# Patient Record
Sex: MALE | Race: WHITE | Employment: OTHER | URBAN - METROPOLITAN AREA
[De-identification: names, ages, dates, MRNs, and addresses within clinical notes are randomized per-mention and may not be internally consistent; named-entity substitution may affect disease eponyms.]

---

## 2017-05-01 ENCOUNTER — HOSPITAL ENCOUNTER (OUTPATIENT)
Dept: GENERAL RADIOLOGY | Age: 52
Discharge: HOME | End: 2017-05-01
Payer: COMMERCIAL

## 2017-05-01 DIAGNOSIS — R05.9 COUGH: ICD-10-CM

## 2017-05-01 PROCEDURE — 71020 XR CHEST PA LAT: CPT

## 2017-05-15 ENCOUNTER — OFFICE VISIT (OUTPATIENT)
Dept: PRIMARY CARE CLINIC | Age: 52
End: 2017-05-15

## 2017-05-15 VITALS
SYSTOLIC BLOOD PRESSURE: 131 MMHG | OXYGEN SATURATION: 98 % | BODY MASS INDEX: 29.58 KG/M2 | HEART RATE: 104 BPM | HEIGHT: 72 IN | WEIGHT: 218.4 LBS | DIASTOLIC BLOOD PRESSURE: 93 MMHG | RESPIRATION RATE: 16 BRPM | TEMPERATURE: 97.8 F

## 2017-05-15 DIAGNOSIS — R61 SWEAT, SWEATING, EXCESSIVE: ICD-10-CM

## 2017-05-15 DIAGNOSIS — R42 DIZZINESS: Primary | ICD-10-CM

## 2017-05-15 DIAGNOSIS — R11.0 NAUSEA: ICD-10-CM

## 2017-05-15 LAB
GLUCOSE DOSE-GTT, POCT, GLDSPOCT: 104
MONONUCLEOSIS SCREEN POC: POSITIVE
QUICKVUE INFLUENZA TEST: NEGATIVE
VALID INTERNAL CONTROL?: YES
VALID INTERNAL CONTROL?: YES

## 2017-05-15 RX ORDER — TIZANIDINE 4 MG/1
TABLET ORAL
Refills: 3 | COMMUNITY
Start: 2017-02-16 | End: 2019-01-28 | Stop reason: SDUPTHER

## 2017-05-15 RX ORDER — DICLOFENAC SODIUM 75 MG/1
TABLET, DELAYED RELEASE ORAL
Refills: 3 | COMMUNITY
Start: 2017-04-15 | End: 2017-05-15

## 2017-05-15 RX ORDER — AZITHROMYCIN 250 MG/1
TABLET, FILM COATED ORAL
Refills: 0 | COMMUNITY
Start: 2017-05-01 | End: 2017-05-15

## 2017-05-15 RX ORDER — CHLORTHALIDONE 25 MG/1
TABLET ORAL
Refills: 1 | COMMUNITY
Start: 2017-02-20

## 2017-05-15 RX ORDER — OXYCODONE AND ACETAMINOPHEN 10; 325 MG/1; MG/1
TABLET ORAL
Refills: 0 | COMMUNITY
Start: 2017-04-20 | End: 2019-01-05

## 2017-05-15 RX ORDER — PANTOPRAZOLE SODIUM 40 MG/1
TABLET, DELAYED RELEASE ORAL
Refills: 0 | COMMUNITY
Start: 2017-02-16 | End: 2019-01-28 | Stop reason: SDUPTHER

## 2017-05-15 RX ORDER — PREDNISONE 5 MG/1
TABLET ORAL
Refills: 0 | COMMUNITY
Start: 2017-05-01 | End: 2017-05-15

## 2017-05-15 RX ORDER — MOMETASONE FUROATE AND FORMOTEROL FUMARATE DIHYDRATE 200; 5 UG/1; UG/1
AEROSOL RESPIRATORY (INHALATION)
Refills: 2 | COMMUNITY
Start: 2017-05-02 | End: 2020-02-20

## 2017-05-15 RX ORDER — HYDROCODONE BITARTRATE AND ACETAMINOPHEN 10; 325 MG/1; MG/1
TABLET ORAL
Refills: 0 | COMMUNITY
Start: 2017-03-18 | End: 2017-05-15

## 2017-05-15 RX ORDER — ONDANSETRON 4 MG/1
TABLET, ORALLY DISINTEGRATING ORAL
Refills: 2 | COMMUNITY
Start: 2017-02-23 | End: 2019-01-28 | Stop reason: SDUPTHER

## 2017-05-15 RX ORDER — NORTRIPTYLINE HYDROCHLORIDE 25 MG/1
CAPSULE ORAL
Refills: 5 | COMMUNITY
Start: 2017-03-17 | End: 2019-01-28 | Stop reason: SDUPTHER

## 2017-05-15 NOTE — PROGRESS NOTES
This note will not be viewable in [address] Subjective:   Anali Adams is a 54 y.o. male who complains of just not feeling well, nausea, shaky, sweating, chills, and feeling lightheaded, for 2-3 days, stable since that time. Yesterday he had drenching sweats with any kind of activity. The symptoms started over the weekend and worsened today. He has not been able to eat today, feeling nauseated when attempting to eat. He has only had coffee today. His wife, a NP, is here with him today and mentions that he he had a similar episode in November 2016 at his PCP (Dr. Cecy Najera) office. He was sent by EMS to the ED for evaluation. They did not find any cause to explain his symptoms. She also mentions that sometimes his headaches start like this (prodrome). History of cervical dystonia and chronic headaches. He is seen by Dr. Belva Hashimoto, Neurology. He receives botox injections for the headaches, last injection 5/12. About 2 weeks ago he took Percocet (X 4 days) for an intractable headache. In addition, 2 weeks ago he was seen by his PCP, diagnosed with bronchospasm. He was treated with antibiotics, Dulera, and Albuterol. He also quit smoking. He developed thrush a few days ago, currently using Nystatin. He also mentions that he works with and has shared drinks with his son who was recently diagnosed with mono. He denies a history of chest pain, sore throat, swollen glands, cough, shortness of breath, abdominal pain, vomiting and wheezing. Denies any urinary symptoms. Evaluation to date: none. Treatment to date: none. Patient does not smoke cigarettes. Relevant PMH:   Past Medical History:   Diagnosis Date    Cervical dystonia     Hypertension      Past Surgical History:   Procedure Laterality Date    HX ORTHOPAEDIC Left     Shoulder       No Known Allergies      PCP - Dr. Cecy Najera    Review of Systems  Pertinent items are noted in HPI.     Objective:     Visit Vitals    BP (!) 131/93 (BP 1 Location: Left arm, BP Patient Position: Sitting)    Pulse (!) 104    Temp 97.8 °F (36.6 °C) (Oral)    Resp 16    Ht 6' (1.829 m)    Wt 218 lb 6.4 oz (99.1 kg)    SpO2 98%    BMI 29.62 kg/m2     General:  alert, cooperative, pleasant, and diaphoretic   Eyes: negative   Ears: normal TM's and external ear canals AU   Sinuses: Normal paranasal sinuses without tenderness   Mouth:  Lips, mucosa, and tongue normal. Teeth and gums normal and normal findings: posterior pharynx with mild erythema   Neck: supple, symmetrical, trachea midline and no adenopathy. Heart: S1 and S2 normal, no murmurs noted. Lungs: clear to auscultation bilaterally   Skin: Cool and clammy   Abdomen: soft, non-tender. Bowel sounds normal. No masses,  no organomegaly        Rapid flu - negative  Monospot - positive (delayed result)  Blood sugar - 104  12 lead EKG - ST with rate of 103      Assessment/Plan:       ICD-10-CM ICD-9-CM    1. Dizziness R42 780.4 AMB POC GLUCOSE TEST      AMB POC EKG ROUTINE W/ 12 LEADS, INTER & REP   2. Sweat, sweating, excessive R61 780.8 AMB POC GLUCOSE TEST      AMB POC RAPID INFLUENZA TEST      AMB POC EKG ROUTINE W/ 12 LEADS, INTER & REP   3. Nausea R11.0 787.02 AMB POC GLUCOSE TEST      AMB POC RAPID INFLUENZA TEST      AMB POC EKG ROUTINE W/ 12 LEADS, INTER & REP     Pt became very dizzy and feeling faint in office. He laid down and is sweating profusely. Recommend he go to ED now for evaluation. He feels a little headache now and believes he has a headache starting. After laying down for a few minutes, he felt a little better. He states he doesn't want to go, his wife is trying to encourage him to go. They decided to go to her office, try to push fluids, and if any worsening symptoms will go to ED. Monospot with a delayed + result (false +?), offered further testing for EBV, they decline. Recommend ED evaluation and close PCP follow-up. RTC prn.       Catina Márquez, NP

## 2017-05-15 NOTE — PATIENT INSTRUCTIONS
Dizziness: Care Instructions  Your Care Instructions  Dizziness is the feeling of unsteadiness or fuzziness in your head. It is different than having vertigo, which is a feeling that the room is spinning or that you are moving or falling. It is also different from lightheadedness, which is the feeling that you are about to faint. It can be hard to know what causes dizziness. Some people feel dizzy when they have migraine headaches. Sometimes bouts of flu can make you feel dizzy. Some medical conditions, such as heart problems or high blood pressure, can make you feel dizzy. Many medicines can cause dizziness, including medicines for high blood pressure, pain, or anxiety. If a medicine causes your symptoms, your doctor may recommend that you stop or change the medicine. If it is a problem with your heart, you may need medicine to help your heart work better. If there is no clear reason for your symptoms, your doctor may suggest watching and waiting for a while to see if the dizziness goes away on its own. Follow-up care is a key part of your treatment and safety. Be sure to make and go to all appointments, and call your doctor if you are having problems. It's also a good idea to know your test results and keep a list of the medicines you take. How can you care for yourself at home? · If your doctor recommends or prescribes medicine, take it exactly as directed. Call your doctor if you think you are having a problem with your medicine. · Do not drive while you feel dizzy. · Try to prevent falls. Steps you can take include:  ¨ Using nonskid mats, adding grab bars near the tub, and using night-lights. ¨ Clearing your home so that walkways are free of anything you might trip on. ¨ Letting family and friends know that you have been feeling dizzy. This will help them know how to help you. When should you call for help? Call 911 anytime you think you may need emergency care.  For example, call if:  · You passed out (lost consciousness). · You have dizziness along with symptoms of a heart attack. These may include:  ¨ Chest pain or pressure, or a strange feeling in the chest.  ¨ Sweating. ¨ Shortness of breath. ¨ Nausea or vomiting. ¨ Pain, pressure, or a strange feeling in the back, neck, jaw, or upper belly or in one or both shoulders or arms. ¨ Lightheadedness or sudden weakness. ¨ A fast or irregular heartbeat. · You have symptoms of a stroke. These may include:  ¨ Sudden numbness, tingling, weakness, or loss of movement in your face, arm, or leg, especially on only one side of your body. ¨ Sudden vision changes. ¨ Sudden trouble speaking. ¨ Sudden confusion or trouble understanding simple statements. ¨ Sudden problems with walking or balance. ¨ A sudden, severe headache that is different from past headaches. Call your doctor now or seek immediate medical care if:  · You feel dizzy and have a fever, headache, or ringing in your ears. · You have new or increased nausea and vomiting. · Your dizziness does not go away or comes back. Watch closely for changes in your health, and be sure to contact your doctor if:  · You do not get better as expected. Where can you learn more? Go to http://chi-jovita.info/. Enter K552 in the search box to learn more about \"Dizziness: Care Instructions. \"  Current as of: May 27, 2016  Content Version: 11.2  © 9968-4850 [employer]. Care instructions adapted under license by [employer] (which disclaims liability or warranty for this information). If you have questions about a medical condition or this instruction, always ask your healthcare professional. [place] [address] any warranty or liability for your use of this information.

## 2017-05-15 NOTE — PROGRESS NOTES
Chief Complaint   Patient presents with    Lethargy     pt c/o chills and lethargy, denies having flu shot, states son was recently dx with mono    Chills

## 2017-09-21 ENCOUNTER — HOSPITAL ENCOUNTER (OUTPATIENT)
Dept: CT IMAGING | Age: 52
Discharge: HOME | End: 2017-09-21
Attending: FAMILY MEDICINE
Payer: COMMERCIAL

## 2017-09-21 DIAGNOSIS — Z13.6 SCREENING, HEART DISEASE, ISCHEMIC: ICD-10-CM

## 2017-09-21 PROCEDURE — 75571 CT HRT W/O DYE W/CA TEST: CPT

## 2017-09-22 NOTE — CARDIO/PULMONARY
Reached patient at his given home/mobile number and shared his coronary artery CT score of 208 with him. Also shared that the majority of this score is in a single vessel. We discussed the meaning of this score. Patient has no further questions at this time. Patient will follow up with his PCP, Dr. Mayuri Vega.

## 2017-10-12 ENCOUNTER — OFFICE VISIT (OUTPATIENT)
Dept: CARDIOLOGY CLINIC | Age: 52
End: 2017-10-12

## 2017-10-12 VITALS
BODY MASS INDEX: 29.81 KG/M2 | OXYGEN SATURATION: 97 % | WEIGHT: 220.1 LBS | SYSTOLIC BLOOD PRESSURE: 114 MMHG | DIASTOLIC BLOOD PRESSURE: 68 MMHG | HEIGHT: 72 IN | HEART RATE: 77 BPM | RESPIRATION RATE: 20 BRPM

## 2017-10-12 DIAGNOSIS — R53.83 FATIGUE, UNSPECIFIED TYPE: ICD-10-CM

## 2017-10-12 DIAGNOSIS — I25.84 CORONARY ARTERY CALCIFICATION: ICD-10-CM

## 2017-10-12 DIAGNOSIS — I25.10 CORONARY ARTERY CALCIFICATION: ICD-10-CM

## 2017-10-12 DIAGNOSIS — R06.02 SOB (SHORTNESS OF BREATH): Primary | ICD-10-CM

## 2017-10-12 DIAGNOSIS — I10 ESSENTIAL HYPERTENSION: ICD-10-CM

## 2017-10-12 RX ORDER — METOPROLOL SUCCINATE 100 MG/1
50 TABLET, EXTENDED RELEASE ORAL DAILY
COMMUNITY
Start: 2017-10-12 | End: 2019-01-28 | Stop reason: SDUPTHER

## 2017-10-12 NOTE — PROGRESS NOTES
John Bolanos DNP, ANP-BC  Subjective/HPI:     Gale Lorenzana is a 54 y.o. male is here for new patient consultation regarding fatigue, dyspnea on exertion and abnormal coronary calcium score. Patient has a left main score of 181 total 208. Cardiac risk factors include male, hypertension, family history of heart disease. He is a smoker currently on Chantix reducing tobacco intake to 3 cigarettes a day or less. History of hypertension, has been on metoprolol for at least 3 years, stating 1 year ago dosing was increased to 100 mg daily.       Labs from primary care reviewed showing normal CBC, normal TSH, negative Western blot, mild elevated CRP, metabolic panel normal.    FINDINGS:  The coronary calcium in each vessel is as follows:     Left main coronary artery: 181  Left anterior descending coronary artery: 14  Left circumflex coronary artery: 0  Right coronary artery: 0  Posterior descending coronary artery: 0  1st diagonal      Total calcium score: 208      Calcium score interpretation:  0-0 = No evidence of CAD  1-10 = Minimal evidence of CAD   = Mild evidence of CAD  101-400 = Moderate evidence of CAD  >400 = Extensive evidence of CAD    PCP Provider  Rick Ward MD  Past Medical History:   Diagnosis Date    Cervical dystonia     Hypertension       Past Surgical History:   Procedure Laterality Date    HX ORTHOPAEDIC Left     Shoulder     No Known Allergies   Family History   Problem Relation Age of Onset    Hypertension Father     Diabetes Father    Sebastian Vicki Gout Father     Alcohol abuse Sister     Hypertension Brother     Gout Brother       Current Outpatient Prescriptions   Medication Sig    chlorthalidone (HYGROTEN) 25 mg tablet TAKE 1 TABLET BY MOUTH EVERY MORNING    ondansetron (ZOFRAN ODT) 4 mg disintegrating tablet PLACE 1 TAB UNDER THE TONGUE AND ALLOW TO DISSOLVE EERY 8 HOURS AS NEEDED    pantoprazole (PROTONIX) 40 mg tablet TAKE 1 TABLET BY MOUTH DAILY    tiZANidine (ZANAFLEX) 4 mg tablet TAKE 2 TABLETS BY MOUTH AT BEDTIME    albuterol (PROAIR HFA) 90 mcg/actuation inhaler Take 2 Puffs by inhalation every six (6) hours as needed for Wheezing. Indications: BRONCHOSPASM PREVENTION    metoprolol succinate (TOPROL-XL) 100 mg tablet Take  by mouth daily.  DULERA 200-5 mcg/actuation HFA inhaler TAKE 2 PUFFS BY MOUTH EVERY 12 HOURS    nortriptyline (PAMELOR) 25 mg capsule TAKE ONE CAPSULE BY MOUTH AT BEDTIME    oxyCODONE-acetaminophen (PERCOCET 10)  mg per tablet TAKE 1 TABLET BY MOUTH EVERY 6 HOURS AS NEEDED    nortriptyline (PAMELOR) 50 mg capsule Take 50 mg by mouth nightly.  pantoprazole (PROTONIX) 20 mg tablet Take 20 mg by mouth daily. No current facility-administered medications for this visit. Vitals:    10/12/17 1327 10/12/17 1336   BP: 118/70 114/68   Pulse: 77    Resp: 20    SpO2: 97%    Weight: 220 lb 1.6 oz (99.8 kg)    Height: 6' (1.829 m)      Social History     Social History    Marital status:      Spouse name: N/A    Number of children: N/A    Years of education: N/A     Occupational History    Not on file. Social History Main Topics    Smoking status: Current Every Day Smoker     Packs/day: 1.50     Years: 25.00     Types: Cigarettes    Smokeless tobacco: Not on file    Alcohol use No    Drug use: No    Sexual activity: Not on file     Other Topics Concern    Not on file     Social History Narrative       I have reviewed the nurses notes, vitals, problem list, allergy list, medical history, family, social history and medications. Review of Symptoms:    General: Pt denies excessive weight gain or loss. Pt is able to conduct ADL's  HEENT: Denies blurred vision, headaches, epistaxis and difficulty swallowing. Respiratory: Denies shortness of breath, + TUCKER, wheezing or stridor.   Cardiovascular: Denies precordial pain, palpitations, edema or PND  Gastrointestinal: Denies poor appetite, indigestion, abdominal pain or blood in stool  Musculoskeletal: Denies pain or swelling from muscles or joints  Neurologic: Denies tremor, paresthesias, or sensory motor disturbance  Skin: Denies rash, itching or texture change. Physical Exam:      General: Well developed, in no acute distress, cooperative and alert  HEENT: No carotid bruits, no JVD, trach is midline. Neck Supple, PEERL, EOM intact. Heart:  Normal S1/S2 negative S3 or S4. Regular, no murmur, gallop or rub.   Respiratory: Clear bilaterally x 4, no wheezing or rales  Abdomen:   Soft, non-tender, no masses, bowel sounds are active.   Extremities:  No edema, normal cap refill, no cyanosis, atraumatic. Neuro: A&Ox3, speech clear, gait stable. Skin: Skin color is normal. No rashes or lesions.  Non diaphoretic  Vascular: 2+ pulses symmetric in all extremities    Cardiographics    ECG: Normal sinus rhythm  Results for orders placed or performed during the hospital encounter of 11/11/16   EKG, 12 LEAD, INITIAL   Result Value Ref Range    Ventricular Rate 64 BPM    Atrial Rate 64 BPM    P-R Interval 158 ms    QRS Duration 92 ms    Q-T Interval 408 ms    QTC Calculation (Bezet) 420 ms    Calculated P Axis 43 degrees    Calculated T Axis -2 degrees    Diagnosis       Normal sinus rhythm  Minimal voltage criteria for LVH, may be normal variant  Borderline ECG  No previous ECGs available  Confirmed by Ally Fung MD., [facility] ([id number]) on 11/11/2016 5:18:24 PM           Cardiology Labs:  No results found for: CHOL, CHOLX, CHLST, CHOLV, 158609, HDL, LDL, LDLC, DLDLP, [place], [facility], [facility]    Lab Results   Component Value Date/Time    Sodium 139 11/11/2016 01:52 PM    Potassium 3.9 11/11/2016 01:52 PM    Chloride 101 11/11/2016 01:52 PM    CO2 28 11/11/2016 01:52 PM    Anion gap 10 11/11/2016 01:52 PM    Glucose 109 11/11/2016 01:52 PM    BUN 14 11/11/2016 01:52 PM    Creatinine 0.86 11/11/2016 01:52 PM    BUN/Creatinine ratio 16 11/11/2016 01:52 PM    GFR est AA >60 11/11/2016 01:52 PM GFR est non-AA >60 11/11/2016 01:52 PM    Calcium 9.1 11/11/2016 01:52 PM           Assessment:     Assessment:     Diagnoses and all orders for this visit:    1. SOB (shortness of breath)  -     AMB POC EKG ROUTINE W/ 12 LEADS, INTER & REP  -     2D ECHO COMPLETE ADULT (TTE) W OR WO CONTR; Future  -     ECHO TTE STRESS EXRCSE COMP W OR WO CONTR; Future  -     LIPID PANEL    2. Coronary artery calcification  -     2D ECHO COMPLETE ADULT (TTE) W OR WO CONTR; Future  -     ECHO TTE STRESS EXRCSE COMP W OR WO CONTR; Future  -     LIPID PANEL    3. Essential hypertension  -     2D ECHO COMPLETE ADULT (TTE) W OR WO CONTR; Future  -     ECHO TTE STRESS EXRCSE COMP W OR WO CONTR; Future  -     LIPID PANEL        ICD-10-CM ICD-9-CM    1. SOB (shortness of breath) R06.02 786.05 AMB POC EKG ROUTINE W/ 12 LEADS, INTER & REP      2D ECHO COMPLETE ADULT (TTE) W OR WO CONTR      ECHO TTE STRESS EXRCSE COMP W OR WO CONTR      LIPID PANEL   2. Coronary artery calcification I25.10 414.00 2D ECHO COMPLETE ADULT (TTE) W OR WO CONTR    I25.84 414.4 ECHO TTE STRESS EXRCSE COMP W OR WO CONTR      LIPID PANEL   3. Essential hypertension I10 401.9 2D ECHO COMPLETE ADULT (TTE) W OR WO CONTR      ECHO TTE STRESS EXRCSE COMP W OR WO CONTR      LIPID PANEL     Orders Placed This Encounter    LIPID PANEL    AMB POC EKG ROUTINE W/ 12 LEADS, INTER & REP     Order Specific Question:   Reason for Exam:     Answer:   routine    2D ECHO COMPLETE ADULT (TTE) W OR WO CONTR     Standing Status:   Future     Standing Expiration Date:   4/12/2018     Order Specific Question:   Reason for Exam:     Answer:   TUCKER     Order Specific Question:   Contrast Enhancement (Bubble Study, Definity, Optison) may be used if criteria listed in established evidence-based protocol has been identified. Answer:    Yes    ECHO TTE STRESS EXRCSE COMP W OR WO CONTR     Standing Status:   Future     Standing Expiration Date:   4/12/2018     Order Specific Question: Reason for Exam:     Answer:   TUCKER     Order Specific Question:   Contrast Enhancement (Bubble Study, Definity, Optison) may be used if criteria listed in established evidence-based protocol has been identified. Answer:   Yes        Plan:     Patient is a 57-year-old male with a calcium score of 191 in the left main total 208. He is experiencing dyspnea on exertion and fatigue. Cardiac risk factors include hypertension, smoker, male and family history of heart disease. Will rule out ischemia with echocardiogram and stress echo. Will reduce metoprolol from 100 mg daily down to 50 mg daily as this may be a source of his fatigue, goal will be transitioning off beta blockers to angiotensive receptor blockers with diuretic for treatment of hypertension. Start aspirin 81 mg daily, lipid panel most likly will be placing him on statin therapy. Follow-up when testing complete. Summer Avila NP      [facility]    10/12/2017         Agree with note as outlined by  NP. I confirm findings in history and physical exam. No additional findings noted. Agree with plan as outlined above. He has a previous h/o cardiomyopathy with an EF of 15-20% several years ago. Improved per patient.     Amanda Art MD

## 2017-10-24 ENCOUNTER — CLINICAL SUPPORT (OUTPATIENT)
Dept: CARDIOLOGY CLINIC | Age: 52
End: 2017-10-24

## 2017-10-24 DIAGNOSIS — I25.10 CORONARY ARTERY CALCIFICATION: ICD-10-CM

## 2017-10-24 DIAGNOSIS — R06.02 SOB (SHORTNESS OF BREATH): ICD-10-CM

## 2017-10-24 DIAGNOSIS — I10 ESSENTIAL HYPERTENSION: ICD-10-CM

## 2017-10-24 DIAGNOSIS — I25.84 CORONARY ARTERY CALCIFICATION: ICD-10-CM

## 2017-10-27 ENCOUNTER — CLINICAL SUPPORT (OUTPATIENT)
Dept: CARDIOLOGY CLINIC | Age: 52
End: 2017-10-27

## 2017-10-27 DIAGNOSIS — I25.10 CORONARY ARTERY CALCIFICATION: ICD-10-CM

## 2017-10-27 DIAGNOSIS — I25.84 CORONARY ARTERY CALCIFICATION: ICD-10-CM

## 2017-10-27 DIAGNOSIS — I10 ESSENTIAL HYPERTENSION: ICD-10-CM

## 2017-10-27 DIAGNOSIS — R06.02 SOB (SHORTNESS OF BREATH): ICD-10-CM

## 2019-01-05 ENCOUNTER — APPOINTMENT (OUTPATIENT)
Dept: GENERAL RADIOLOGY | Age: 54
End: 2019-01-05
Attending: NURSE PRACTITIONER
Payer: COMMERCIAL

## 2019-01-05 ENCOUNTER — HOSPITAL ENCOUNTER (EMERGENCY)
Age: 54
Discharge: HOME | End: 2019-01-05
Attending: EMERGENCY MEDICINE | Admitting: EMERGENCY MEDICINE
Payer: COMMERCIAL

## 2019-01-05 VITALS
OXYGEN SATURATION: 98 % | HEART RATE: 95 BPM | TEMPERATURE: 98.6 F | HEIGHT: 72 IN | BODY MASS INDEX: 31.89 KG/M2 | DIASTOLIC BLOOD PRESSURE: 128 MMHG | WEIGHT: 235.45 LBS | SYSTOLIC BLOOD PRESSURE: 179 MMHG | RESPIRATION RATE: 16 BRPM

## 2019-01-05 DIAGNOSIS — S61.210A LACERATION OF RIGHT INDEX FINGER WITHOUT FOREIGN BODY WITHOUT DAMAGE TO NAIL, INITIAL ENCOUNTER: Primary | ICD-10-CM

## 2019-01-05 PROCEDURE — 74011250636 HC RX REV CODE- 250/636: Performed by: EMERGENCY MEDICINE

## 2019-01-05 PROCEDURE — 73140 X-RAY EXAM OF FINGER(S): CPT

## 2019-01-05 PROCEDURE — 96372 THER/PROPH/DIAG INJ SC/IM: CPT

## 2019-01-05 PROCEDURE — 74011000250 HC RX REV CODE- 250: Performed by: EMERGENCY MEDICINE

## 2019-01-05 PROCEDURE — 75810000293 HC SIMP/SUPERF WND  RPR

## 2019-01-05 PROCEDURE — 77030031132 HC SUT NYL COVD -A

## 2019-01-05 PROCEDURE — 77030018836 HC SOL IRR NACL ICUM -A

## 2019-01-05 PROCEDURE — 74011250637 HC RX REV CODE- 250/637: Performed by: NURSE PRACTITIONER

## 2019-01-05 PROCEDURE — 77030002986 HC SUT PROL J&J -A

## 2019-01-05 PROCEDURE — 99283 EMERGENCY DEPT VISIT LOW MDM: CPT

## 2019-01-05 RX ORDER — AMOXICILLIN AND CLAVULANATE POTASSIUM 875; 125 MG/1; MG/1
1 TABLET, FILM COATED ORAL 2 TIMES DAILY
Qty: 14 TAB | Refills: 0 | Status: SHIPPED | OUTPATIENT
Start: 2019-01-05 | End: 2019-01-12

## 2019-01-05 RX ORDER — OXYCODONE AND ACETAMINOPHEN 5; 325 MG/1; MG/1
1 TABLET ORAL
Status: COMPLETED | OUTPATIENT
Start: 2019-01-05 | End: 2019-01-05

## 2019-01-05 RX ORDER — AMOXICILLIN AND CLAVULANATE POTASSIUM 875; 125 MG/1; MG/1
1 TABLET, FILM COATED ORAL 2 TIMES DAILY
Qty: 14 TAB | Refills: 0 | Status: SHIPPED | OUTPATIENT
Start: 2019-01-05 | End: 2019-01-05

## 2019-01-05 RX ORDER — OXYCODONE AND ACETAMINOPHEN 5; 325 MG/1; MG/1
1 TABLET ORAL
Qty: 16 TAB | Refills: 0 | Status: SHIPPED | OUTPATIENT
Start: 2019-01-05 | End: 2019-01-28 | Stop reason: SDUPTHER

## 2019-01-05 RX ORDER — CEFAZOLIN SODIUM 1 G/3ML
1 INJECTION, POWDER, FOR SOLUTION INTRAMUSCULAR; INTRAVENOUS
Status: DISCONTINUED | OUTPATIENT
Start: 2019-01-05 | End: 2019-01-05

## 2019-01-05 RX ORDER — OXYCODONE AND ACETAMINOPHEN 5; 325 MG/1; MG/1
1 TABLET ORAL
Qty: 16 TAB | Refills: 0 | Status: SHIPPED | OUTPATIENT
Start: 2019-01-05 | End: 2019-01-05

## 2019-01-05 RX ADMIN — WATER 1000 MG: 1 INJECTION INTRAMUSCULAR; INTRAVENOUS; SUBCUTANEOUS at 17:28

## 2019-01-05 RX ADMIN — OXYCODONE AND ACETAMINOPHEN 1 TABLET: 5; 325 TABLET ORAL at 17:00

## 2019-01-05 NOTE — ED NOTES
Jv Mckeon NP reviewed discharge instructions with the patient. The patient and spouse verbalized understanding. Patient ambulatory out of ED with steady gait. Transportation provided by wife.

## 2019-01-05 NOTE — DISCHARGE INSTRUCTIONS
Patient Education        Cuts on the Hand Closed With Stitches: Care Instructions  Your Care Instructions    A cut on your hand can be on your fingers, your thumb, or the front or back of your hand. Sometimes a cut can injure the tendons, blood vessels, or nerves of your hand. The doctor used stitches to close the cut. Using stitches also helps the cut heal and reduces scarring. The doctor may have given you a splint to help prevent you from moving your hand, fingers, or thumb. If the cut went deep and through the skin, the doctor put in two layers of stitches. The deeper layer brings the deep part of the cut together. These stitches will dissolve and don't need to be removed. The stitches in the upper layer are the ones you see on the cut. You will probably have a bandage. You will need to have the stitches removed, usually in 7 to 14 days. The doctor may suggest that you see a hand specialist if the cut is very deep or if you have trouble moving your fingers or have less feeling in your hand. The doctor has checked you carefully, but problems can develop later. If you notice any problems or new symptoms, get medical treatment right away. Follow-up care is a key part of your treatment and safety. Be sure to make and go to all appointments, and call your doctor if you are having problems. It's also a good idea to know your test results and keep a list of the medicines you take. How can you care for yourself at home? · Keep the cut dry for the first 24 to 48 hours. After this, you can shower if your doctor okays it. Pat the cut dry. · Don't soak the cut, such as in a bathtub. Your doctor will tell you when it's safe to get the cut wet. · If your doctor told you how to care for your cut, follow your doctor's instructions. If you did not get instructions, follow this general advice:  ? After the first 24 to 48 hours, wash around the cut with clean water 2 times a day.  Don't use hydrogen peroxide or alcohol, which can slow healing. ? You may cover the cut with a thin layer of petroleum jelly, such as Vaseline, and a nonstick bandage. ? Apply more petroleum jelly and replace the bandage as needed. · Prop up the sore hand on a pillow anytime you sit or lie down during the next 3 days. Try to keep it above the level of your heart. This will help reduce swelling. · Avoid any activity that could cause your cut to reopen. · Do not remove the stitches on your own. Your doctor will tell you when to come back to have the stitches removed. · Be safe with medicines. Take pain medicines exactly as directed. ? If the doctor gave you a prescription medicine for pain, take it as prescribed. ? If you are not taking a prescription pain medicine, ask your doctor if you can take an over-the-counter medicine. When should you call for help? Call your doctor now or seek immediate medical care if:    · You have new pain, or your pain gets worse.     · The skin near the cut is cold or pale or changes color.     · You have tingling, weakness, or numbness near the cut.     · The cut starts to bleed, and blood soaks through the bandage. Oozing small amounts of blood is normal.     · You have trouble moving the area of the hand near the cut.     · You have symptoms of infection, such as:  ? Increased pain, swelling, warmth, or redness around the cut.  ? Red streaks leading from the cut.  ? Pus draining from the cut.  ? A fever.    Watch closely for changes in your health, and be sure to contact your doctor if:    · You do not get better as expected. Where can you learn more? Go to http://chi-jovita.info/. Enter T250 in the search box to learn more about \"Cuts on the Hand Closed With Stitches: Care Instructions. \"  Current as of: November 20, 2017  Content Version: 11.8  © 3904-4296 [employer].  Care instructions adapted under license by [employer] (which disclaims liability or warranty for this information). If you have questions about a medical condition or this instruction, always ask your healthcare professional. [address] any warranty or liability for your use of this information. We hope that we have addressed all of your medical concerns. The examination and treatment you received in the Emergency Department were for an emergent problem and were not intended as complete care. It is important that you follow up with your healthcare provider(s) for ongoing care. If your symptoms worsen or do not improve as expected, and you are unable to reach your usual health care provider(s), you should return to the Emergency Department. Le[facility] Cheek participate in nationally recognized quality of care measures. If your blood pressure is greater than 120/80, as reported below, we urge that you seek medical care to address the potential of high blood pressure, commonly known as hypertension. Hypertension can be hereditary or can be caused by certain medical conditions, pain, stress, or \"white coat syndrome. \"       Please make an appointment with your health care provider(s) for follow up of your Emergency Department visit. VITALS:   Patient Vitals for the past 8 hrs:   Temp Pulse Resp BP SpO2   01/05/19 1602 98.6 °F (37 °C) 95 16 (!) 179/128 98 %          Thank you for allowing us to provide you with medical care today. We realize that you have many choices for your emergency care needs. Please choose us in the future for any continued health care needs. Margo Altamirano Scales, NP            No results found for this or any previous visit (from the past 24 hour(s)). Xr 2nd Finger Rt Min 2 V    Result Date: 1/5/2019  EXAM: XR 2ND FINGER RT MIN 2 V INDICATION: Smashed right second digit in , laceration. COMPARISON: None.  FINDINGS: Three views of the right second finger demonstrate no fracture or other acute osseous or articular abnormality. The soft tissues are within normal limits. IMPRESSION: No acute abnormality.

## 2019-01-06 NOTE — ED PROVIDER NOTES
EMERGENCY DEPARTMENT HISTORY AND PHYSICAL EXAM 
 
 
Date: 1/5/2019 Patient Name: Ranjana Mcgee History of Presenting Illness Chief Complaint Patient presents with  Finger Pain  
  pt reports smashing right 2nd finger in  today History Provided By: Patient HPI: Ranjana Mcgee, 48 y.o. male presents ambulatory to the ED having cut his right second digit with a  just PTA. Tetanus is UTD. He was in his normal state of health prior. There are no other acute health care concerns today. Pt denies fevers, chills, night sweats, chest pain, pressure, SOB, TUCKER, PND, orthopnea, abdominal pain, n/v/d, melena, hematuria, dysuria, constipation, HA, dizziness, and syncope There are no other complaints, changes, or physical findings at this time. PCP: Dez Gonzalez MD 
 
No current facility-administered medications on file prior to encounter. Current Outpatient Medications on File Prior to Encounter Medication Sig Dispense Refill  metoprolol succinate (TOPROL-XL) 100 mg tablet Take 0.5 Tabs by mouth daily.  chlorthalidone (HYGROTEN) 25 mg tablet TAKE 1 TABLET BY MOUTH EVERY MORNING  1  
 DULERA 200-5 mcg/actuation HFA inhaler TAKE 2 PUFFS BY MOUTH EVERY 12 HOURS  2  
 nortriptyline (PAMELOR) 25 mg capsule TAKE ONE CAPSULE BY MOUTH AT BEDTIME  5  
 ondansetron (ZOFRAN ODT) 4 mg disintegrating tablet PLACE 1 TAB UNDER THE TONGUE AND ALLOW TO DISSOLVE EERY 8 HOURS AS NEEDED  2  
 pantoprazole (PROTONIX) 40 mg tablet TAKE 1 TABLET BY MOUTH DAILY  0  
 tiZANidine (ZANAFLEX) 4 mg tablet TAKE 2 TABLETS BY MOUTH AT BEDTIME  3  
 nortriptyline (PAMELOR) 50 mg capsule Take 50 mg by mouth nightly.  albuterol (PROAIR HFA) 90 mcg/actuation inhaler Take 2 Puffs by inhalation every six (6) hours as needed for Wheezing. Indications: BRONCHOSPASM PREVENTION 1 Inhaler 0  
 pantoprazole (PROTONIX) 20 mg tablet Take 20 mg by mouth daily. Past History Past Medical History: 
Past Medical History:  
Diagnosis Date  Cervical dystonia  Hypertension Past Surgical History: 
Past Surgical History:  
Procedure Laterality Date  HX ORTHOPAEDIC Left Shoulder Family History: 
Family History Problem Relation Age of Onset  Hypertension Father  Diabetes Father  Gout Father  Alcohol abuse Sister  Hypertension Brother  Gout Brother Social History: 
Social History Tobacco Use  Smoking status: Current Every Day Smoker Packs/day: 1.50 Years: 25.00 Pack years: 37.50 Types: Cigarettes Substance Use Topics  Alcohol use: No  
  Alcohol/week: 0.0 oz  Drug use: No  
 
 
Allergies: 
No Known Allergies Review of Systems Review of Systems Constitutional: Negative for activity change, appetite change, chills, diaphoresis, fatigue, fever and unexpected weight change. HENT: Negative for congestion, ear pain, rhinorrhea, sinus pressure, sore throat, tinnitus, trouble swallowing and voice change. Eyes: Negative for pain, discharge, redness and visual disturbance. Respiratory: Negative for apnea, cough, choking, chest tightness, shortness of breath, wheezing and stridor. Cardiovascular: Negative for chest pain, palpitations and leg swelling. Gastrointestinal: Negative for abdominal pain, constipation, nausea and vomiting. Endocrine: Negative for cold intolerance and heat intolerance. Genitourinary: Negative for difficulty urinating, dysuria, flank pain, hematuria, testicular pain and urgency. Musculoskeletal: Negative for arthralgias, back pain, gait problem, joint swelling, myalgias, neck pain and neck stiffness. Skin: Positive for wound. Negative for color change, pallor and rash. Allergic/Immunologic: Negative for immunocompromised state. Neurological: Negative for dizziness, tremors, syncope, weakness, light-headedness, numbness and headaches. Hematological: Does not bruise/bleed easily. Psychiatric/Behavioral: Negative for agitation, confusion and suicidal ideas. Physical Exam  
Physical Exam  
Constitutional: He is oriented to person, place, and time. He appears well-developed and well-nourished. No distress. HENT:  
Head: Atraumatic. Nose: Nose normal.  
Mouth/Throat: No oropharyngeal exudate. Eyes: Conjunctivae and EOM are normal. Right eye exhibits no discharge. Left eye exhibits no discharge. No scleral icterus. Neck: Normal range of motion. Neck supple. No JVD present. No tracheal deviation present. No thyromegaly present. Cardiovascular: Normal rate and regular rhythm. Exam reveals no gallop and no friction rub. No murmur heard. Pulmonary/Chest: Breath sounds normal. No stridor. No respiratory distress. He has no wheezes. He has no rales. He exhibits no tenderness. Abdominal: Soft. Bowel sounds are normal. He exhibits no distension and no mass. There is no tenderness. There is no rebound and no guarding. Musculoskeletal: Normal range of motion. He exhibits no edema or tenderness. Right hand: He exhibits laceration. Hands: 
Lymphadenopathy:  
  He has no cervical adenopathy. Neurological: He is alert and oriented to person, place, and time. Coordination normal.  
Skin: Skin is warm and dry. He is not diaphoretic. Psychiatric: He has a normal mood and affect. His behavior is normal.  
Nursing note and vitals reviewed. Diagnostic Study Results Labs - No results found for this or any previous visit (from the past 12 hour(s)). Radiologic Studies -  
XR 2ND FINGER RT MIN 2 V Final Result IMPRESSION: No acute abnormality. CT Results  (Last 48 hours) None CXR Results  (Last 48 hours) None Medical Decision Making I am the first provider for this patient.  
 
I reviewed the vital signs, available nursing notes, past medical history, past surgical history, family history and social history. Vital Signs-Reviewed the patient's vital signs. Visit Vitals BP (!) 179/128 (BP 1 Location: Left arm, BP Patient Position: Sitting) Pulse 95 Temp 98.6 °F (37 °C) Resp 16 Ht 6' (1.829 m) Wt 106.8 kg (235 lb 7.2 oz) SpO2 98% BMI 31.93 kg/m² Pulse Oximetry Analysis - 98% on RA Records Reviewed: Nursing Notes and Old Medical Records Provider Notes (Medical Decision Making): Laceration XR is suspicious for fracture Wound repair Ancef Wound Repair 
Date/Time: 1/5/2019 6:00 PM 
Performed by: NPPreparation: skin prepped with Betadine Pre-procedure re-eval: Immediately prior to the procedure, the patient was reevaluated and found suitable for the planned procedure and any planned medications. Time out: Immediately prior to the procedure a time out was called to verify the correct patient, procedure, equipment, staff and marking as appropriate. [facility] Location details: right index finger Wound length:2.5 cm or less Anesthesia: digital block Anesthesia: 
Local Anesthetic: lidocaine 1% without epinephrine Irrigation solution: saline Irrigation method: jet lavage Debridement: none Skin closure: Prolene and 5-0 nylon Number of sutures: 7 Technique: simple Approximation: close Dressing: antibiotic ointment, gauze roll and splint Patient tolerance: Patient tolerated the procedure well with no immediate complications My total time at bedside, performing this procedure was 16-30 minutes. ED Course:  
Initial assessment performed. The patients presenting problems have been discussed, and they are in agreement with the care plan formulated and outlined with them. I have encouraged them to ask questions as they arise throughout their visit. Stable, ambulatory pt in [facility] Critical Care Time:  
0 Disposition: 
Discharge to home with Orthohand follow up PLAN: 
1. Discharge Medication List as of 2019  5:30 PM  
  
START taking these medications Details  
amoxicillin-clavulanate (AUGMENTIN) 875-125 mg per tablet Take 1 Tab by mouth two (2) times a day for 7 days. , Print, Disp-14 Tab, R-0  
  
oxyCODONE-acetaminophen (PERCOCET) 5-325 mg per tablet Take 1 Tab by mouth every four (4) hours as needed for Pain. Max Daily Amount: 6 Tabs., Print, Disp-16 Tab, R-0  
  
  
CONTINUE these medications which have NOT CHANGED Details  
metoprolol succinate (TOPROL-XL) 100 mg tablet Take 0.5 Tabs by mouth daily. , Historical Med  
  
chlorthalidone (HYGROTEN) 25 mg tablet TAKE 1 TABLET BY MOUTH EVERY MORNING, Historical Med, R-1  
  
DULERA 200-5 mcg/actuation HFA inhaler TAKE 2 PUFFS BY MOUTH EVERY 12 HOURS, Historical Med, R-2, JOSE  
  
!! nortriptyline (PAMELOR) 25 mg capsule TAKE ONE CAPSULE BY MOUTH AT BEDTIME, Historical Med, R-5  
  
ondansetron (ZOFRAN ODT) 4 mg disintegrating tablet PLACE 1 TAB UNDER THE TONGUE AND ALLOW TO DISSOLVE EERY 8 HOURS AS NEEDED, Historical Med, R-2  
  
!! pantoprazole (PROTONIX) 40 mg tablet TAKE 1 TABLET BY MOUTH DAILY, Historical Med, R-0  
  
tiZANidine (ZANAFLEX) 4 mg tablet TAKE 2 TABLETS BY MOUTH AT BEDTIME, Historical Med, R-3  
  
!! nortriptyline (PAMELOR) 50 mg capsule Take 50 mg by mouth nightly., Historical Med  
  
albuterol (PROAIR HFA) 90 mcg/actuation inhaler Take 2 Puffs by inhalation every six (6) hours as needed for Wheezing. Indications: BRONCHOSPASM PREVENTION, Print, Disp-1 Inhaler, R-0  
  
!! pantoprazole (PROTONIX) 20 mg tablet Take 20 mg by mouth daily. , Historical Med  
  
 !! - Potential duplicate medications found. Please discuss with provider. STOP taking these medications  
  
 oxyCODONE-acetaminophen (PERCOCET 10)  mg per tablet Comments:  
Reason for Stoppin.  
Follow-up Information Follow up With Specialties Details Why Contact Info Bell Rosario MD Family Practice In 2 days  [address] [facility] 
[phone] Flavio Hunt MD Hand Surgery In 2 days  [address] [facility] 
[phone] \[facility] [place] [facility]\"" EMERGENCY DEPT Emergency Medicine  As needed, If symptoms worsen [address] [facility] [address] 
[phone] Return to ED if worse Diagnosis Clinical Impression: 1. Laceration of right index finger without foreign body without damage to nail, initial encounter Attestations: 
 
Liat Dubois NP 
11:17 AM

## 2019-01-28 ENCOUNTER — OFFICE VISIT (OUTPATIENT)
Dept: NEUROLOGY | Age: 54
End: 2019-01-28

## 2019-01-28 VITALS
OXYGEN SATURATION: 97 % | DIASTOLIC BLOOD PRESSURE: 70 MMHG | BODY MASS INDEX: 32.1 KG/M2 | HEART RATE: 88 BPM | SYSTOLIC BLOOD PRESSURE: 140 MMHG | HEIGHT: 72 IN | WEIGHT: 237 LBS

## 2019-01-28 DIAGNOSIS — G43.709 CHRONIC MIGRAINE WITHOUT AURA WITHOUT STATUS MIGRAINOSUS, NOT INTRACTABLE: ICD-10-CM

## 2019-01-28 DIAGNOSIS — G24.3 CERVICAL DYSTONIA: Primary | ICD-10-CM

## 2019-01-28 RX ORDER — PANTOPRAZOLE SODIUM 40 MG/1
TABLET, DELAYED RELEASE ORAL
COMMUNITY
End: 2022-05-11

## 2019-01-28 RX ORDER — BACLOFEN 10 MG/1
TABLET ORAL
COMMUNITY
End: 2020-02-20

## 2019-01-28 RX ORDER — ALBUTEROL SULFATE 90 UG/1
AEROSOL, METERED RESPIRATORY (INHALATION)
COMMUNITY
End: 2020-02-20

## 2019-01-28 RX ORDER — DEXLANSOPRAZOLE 30 MG/1
CAPSULE, DELAYED RELEASE ORAL
Refills: 0 | COMMUNITY
Start: 2018-12-06

## 2019-01-28 RX ORDER — GUAIFENESIN 100 MG/5ML
LIQUID (ML) ORAL
Refills: 10 | COMMUNITY
Start: 2018-12-30 | End: 2020-02-20

## 2019-01-28 RX ORDER — FEBUXOSTAT 40 MG/1
TABLET ORAL
Refills: 0 | COMMUNITY
Start: 2018-12-27 | End: 2020-02-20

## 2019-01-28 RX ORDER — COLCHICINE 0.6 MG/1
TABLET ORAL
COMMUNITY
End: 2020-02-20

## 2019-01-28 RX ORDER — HYDROCODONE BITARTRATE AND ACETAMINOPHEN 10; 325 MG/1; MG/1
TABLET ORAL
Refills: 0 | COMMUNITY
Start: 2018-12-05 | End: 2022-05-11

## 2019-01-28 RX ORDER — ATORVASTATIN CALCIUM 80 MG/1
TABLET, FILM COATED ORAL
Refills: 10 | COMMUNITY
Start: 2019-01-01

## 2019-01-28 RX ORDER — METOPROLOL SUCCINATE 50 MG/1
TABLET, EXTENDED RELEASE ORAL
Refills: 0 | COMMUNITY
Start: 2018-12-15

## 2019-01-28 NOTE — PATIENT INSTRUCTIONS
Office Policies  o Phone calls/patient messages:  Please allow up to 24 hours for someone in the office to contact you about your call or message. Be mindful your provider may be out of the office or your message may require further review. We encourage you to use [facility] for your messages as this is a faster, more efficient way to communicate with our office  o Medication Refills:  Prescription medications require up to 48 business hours to process. We encourage you to use [facility] for your refills. For controlled medications: Please allow up to 72 business hours to process. Certain medications may require you to  a written prescription at our office. NO narcotic/controlled medications will be prescribed after 4pm Monday through Friday or on weekends  o Form/Paperwork Completion:   We ask that you allow 7-14 business days. You may also download your forms to [facility] to have your doctor print off. A Healthy Lifestyle: Care Instructions  Your Care Instructions    A healthy lifestyle can help you feel good, stay at a healthy weight, and have plenty of energy for both work and play. A healthy lifestyle is something you can share with your whole family. A healthy lifestyle also can lower your risk for serious health problems, such as high blood pressure, heart disease, and diabetes. You can follow a few steps listed below to improve your health and the health of your family. Follow-up care is a key part of your treatment and safety. Be sure to make and go to all appointments, and call your doctor if you are having problems. It's also a good idea to know your test results and keep a list of the medicines you take. How can you care for yourself at home? · Do not eat too much sugar, fat, or fast foods. You can still have dessert and treats now and then. The goal is moderation. · Start small to improve your eating habits.  Pay attention to portion sizes, drink less juice and soda pop, and eat more fruits and vegetables. ? Eat a healthy amount of food. A 3-ounce serving of meat, for example, is about the size of a deck of cards. Fill the rest of your plate with vegetables and whole grains. ? Limit the amount of soda and sports drinks you have every day. Drink more water when you are thirsty. ? Eat at least 5 servings of fruits and vegetables every day. It may seem like a lot, but it is not hard to reach this goal. A serving or helping is 1 piece of fruit, 1 cup of vegetables, or 2 cups of leafy, raw vegetables. Have an apple or some carrot sticks as an afternoon snack instead of a candy bar. Try to have fruits and/or vegetables at every meal.  · Make exercise part of your daily routine. You may want to start with simple activities, such as walking, bicycling, or slow swimming. Try to be active 30 to 60 minutes every day. You do not need to do all 30 to 60 minutes all at once. For example, you can exercise 3 times a day for 10 or 20 minutes. Moderate exercise is safe for most people, but it is always a good idea to talk to your doctor before starting an exercise program.  · Keep moving. Marcela Banner the lawn, work in the garden, or [employer]. Take the stairs instead of the elevator at work. · If you smoke, quit. People who smoke have an increased risk for heart attack, stroke, cancer, and other lung illnesses. Quitting is hard, but there are ways to boost your chance of quitting tobacco for good. ? Use nicotine gum, patches, or lozenges. ? Ask your doctor about stop-smoking programs and medicines. ? Keep trying. In addition to reducing your risk of diseases in the future, you will notice some benefits soon after you stop using tobacco. If you have shortness of breath or asthma symptoms, they will likely get better within a few weeks after you quit. · Limit how much alcohol you drink. Moderate amounts of alcohol (up to 2 drinks a day for men, 1 drink a day for women) are okay.  But drinking too much can lead to liver problems, high blood pressure, and other health problems. Family health  If you have a family, there are many things you can do together to improve your health. · Eat meals together as a family as often as possible. · Eat healthy foods. This includes fruits, vegetables, lean meats and dairy, and whole grains. · Include your family in your fitness plan. Most people think of activities such as jogging or tennis as the way to fitness, but there are many ways you and your family can be more active. Anything that makes you breathe hard and gets your heart pumping is exercise. Here are some tips:  ? Walk to do errands or to take your child to school or the bus.  ? Go for a family bike ride after dinner instead of watching TV. Where can you learn more? Go to http://chi-jovita.info/. Enter G557 in the search box to learn more about \"A Healthy Lifestyle: Care Instructions. \"  Current as of: September 11, 2018  Content Version: 11.9  © 8517-8076 [employer]. Care instructions adapted under license by [employer] (which disclaims liability or warranty for this information). If you have questions about a medical condition or this instruction, always ask your healthcare professional. [place] [address] any warranty or liability for your use of this information. Torticollis: Care Instructions  Your Care Instructions  Torticollis is a severe tightness of the muscles on one side of the neck. The tight muscles can make the head turn to one side, lean to one side, or be pulled forward or backward. It is also called wryneck. Your doctor asked questions about your health and examined you. You may also have had X-rays or other tests. If your doctor thinks another medical problem is causing your tight neck muscles, you may need more tests. Torticollis usually gets better with home care.  Your doctor may have you take medicine to relieve pain or relax your muscles. He or she may suggest exercise and physical therapy to help increase flexibility and relieve stress. Your doctor may also have you wear a special collar, called a cervical collar, for a day or two. The collar may help make your neck more comfortable. Follow-up care is a key part of your treatment and safety. Be sure to make and go to all appointments, and call your doctor if you are having problems. It's also a good idea to know your test results and keep a list of the medicines you take. How can you care for yourself at home? · Be safe with medicines. Read and follow all instructions on the label. ? If the doctor gave you a prescription medicine for pain, take it as prescribed. ? If you are not taking a prescription pain medicine, ask your doctor if you can take an over-the-counter medicine. · Try using a heating pad on a low or medium setting for 15 to 20 minutes every 2 or 3 hours. Try a warm shower in place of one session with the heating pad. · Try using an ice pack for 10 to 15 minutes every 2 to 3 hours. Put a thin cloth between the ice and your skin. · If your doctor recommends a cervical collar, wear it exactly as directed. When should you call for help? Call your doctor now or seek immediate medical care if:    · You have new or worse numbness in your arms, buttocks, or legs.     · You have new or worse weakness in your arms or legs.     · Your neck pain gets worse.     · You lose bladder or bowel control.    Watch closely for changes in your health, and be sure to contact your doctor if:    · You do not get better as expected. Where can you learn more? Go to http://chi-jovita.info/. Enter Q383 in the search box to learn more about \"Torticollis: Care Instructions. \"  Current as of: September 20, 2018  Content Version: 11.9  © 1880-8144 [employer].  Care instructions adapted under license by [employer] (which disclaims liability or warranty for this information). If you have questions about a medical condition or this instruction, always ask your healthcare professional. [address] any warranty or liability for your use of this information. Migraine Headache: Care Instructions  Your Care Instructions  Migraines are painful, throbbing headaches that often start on one side of the head. They may cause nausea and vomiting and make you sensitive to light, sound, or smell. Without treatment, migraines can last from 4 hours to a few days. Medicines can help prevent migraines or stop them after they have started. Your doctor can help you find which ones work best for you. Follow-up care is a key part of your treatment and safety. Be sure to make and go to all appointments, and call your doctor if you are having problems. It's also a good idea to know your test results and keep a list of the medicines you take. How can you care for yourself at home? · Do not drive if you have taken a prescription pain medicine. · Rest in a quiet, dark room until your headache is gone. Close your eyes, and try to relax or go to sleep. Don't watch TV or read. · Put a cold, moist cloth or cold pack on the painful area for 10 to 20 minutes at a time. Put a thin cloth between the cold pack and your skin. · Use a warm, moist towel or a heating pad set on low to relax tight shoulder and neck muscles. · Have someone gently massage your neck and shoulders. · Take your medicines exactly as prescribed. Call your doctor if you think you are having a problem with your medicine. You will get more details on the specific medicines your doctor prescribes. · Be careful not to take pain medicine more often than the instructions allow. You could get worse or more frequent headaches when the medicine wears off. To prevent migraines  · Keep a headache diary so you can figure out what triggers your headaches.  Avoiding triggers may help you prevent headaches. Record when each headache began, how long it lasted, and what the pain was like. (Was it throbbing, aching, stabbing, or dull?) Write down any other symptoms you had with the headache, such as nausea, flashing lights or dark spots, or sensitivity to bright light or loud noise. Note if the headache occurred near your period. List anything that might have triggered the headache. Triggers may include certain foods (chocolate, cheese, wine) or odors, smoke, bright light, stress, or lack of sleep. · If your doctor has prescribed medicine for your migraines, take it as directed. You may have medicine that you take only when you get a migraine and medicine that you take all the time to help prevent migraines. ? If your doctor has prescribed medicine for when you get a headache, take it at the first sign of a migraine, unless your doctor has given you other instructions. ? If your doctor has prescribed medicine to prevent migraines, take it exactly as prescribed. Call your doctor if you think you are having a problem with your medicine. · Find healthy ways to deal with stress. Migraines are most common during or right after stressful times. Take time to relax before and after you do something that has caused a migraine in the past.  · Try to keep your muscles relaxed by keeping good posture. Check your jaw, face, neck, and shoulder muscles for tension. Try to relax them. When you sit at a desk, change positions often. And make sure to stretch for 30 seconds each hour. · Get plenty of sleep and exercise. · Eat meals on a regular schedule. Avoid foods and drinks that often trigger migraines. These include chocolate, alcohol (especially red wine and port), aspartame, monosodium glutamate (MSG), and some additives found in foods (such as hot dogs, schmitz, cold cuts, aged cheeses, and pickled foods). · Limit caffeine. Don't drink too much coffee, tea, or soda. But don't quit caffeine suddenly.  That can also give you migraines. · Do not smoke or allow others to smoke around you. If you need help quitting, talk to your doctor about stop-smoking programs and medicines. These can increase your chances of quitting for good. · If you are taking birth control pills or hormone therapy, talk to your doctor about whether they are triggering your migraines. When should you call for help? Call 911 anytime you think you may need emergency care. For example, call if:    · You have signs of a stroke. These may include:  ? Sudden numbness, paralysis, or weakness in your face, arm, or leg, especially on only one side of your body. ? Sudden vision changes. ? Sudden trouble speaking. ? Sudden confusion or trouble understanding simple statements. ? Sudden problems with walking or balance. ? A sudden, severe headache that is different from past headaches.    Call your doctor now or seek immediate medical care if:    · You have new or worse nausea and vomiting.     · You have a new or higher fever.     · Your headache gets much worse.    Watch closely for changes in your health, and be sure to contact your doctor if:    · You are not getting better after 2 days (48 hours). Where can you learn more? Go to http://chi-jovita.info/. Enter R052 in the search box to learn more about \"Migraine Headache: Care Instructions. \"  Current as of: Adia 3, 2018  Content Version: 11.9  © 0007-7870 [employer]. Care instructions adapted under license by [employer] (which disclaims liability or warranty for this information). If you have questions about a medical condition or this instruction, always ask your healthcare professional. [place] [address] any warranty or liability for your use of this information.

## 2019-01-28 NOTE — PROGRESS NOTES
NEUROLOGY CLINIC NOTE    Patient ID:  Sofie Cruz  [id number]  32 y.o.  1965    Date of Consultation:  January 28, 2019    Reason for Consultation:  Neck pain and headaches    Chief Complaint   Patient presents with    New Patient      severe headaches       History of Present Illness:     Patient Active Problem List    Diagnosis Date Noted    SOB (shortness of breath) 10/12/2017     Past Medical History:   Diagnosis Date    Cervical dystonia     Headache     Hypertension       Past Surgical History:   Procedure Laterality Date    HX ORTHOPAEDIC Left     Shoulder      Prior to Admission medications    Medication Sig Start Date End Date Taking? Authorizing Provider   metoprolol succinate (TOPROL-XL) 100 mg tablet Take 0.5 Tabs by mouth daily. 10/12/17  Yes Leno Tolentino NP   chlorthalidone (HYGROTEN) 25 mg tablet TAKE 1 TABLET BY MOUTH EVERY MORNING 2/20/17  Yes Provider, Historical   DULERA 200-5 mcg/actuation HFA inhaler TAKE 2 PUFFS BY MOUTH EVERY 12 HOURS 5/2/17  Yes Provider, Historical   ondansetron (ZOFRAN ODT) 4 mg disintegrating tablet PLACE 1 TAB UNDER THE TONGUE AND ALLOW TO DISSOLVE EERY 8 HOURS AS NEEDED 2/23/17  Yes Provider, Historical   albuterol (PROAIR HFA) 90 mcg/actuation inhaler Take 2 Puffs by inhalation every six (6) hours as needed for Wheezing. Indications: BRONCHOSPASM PREVENTION 10/23/16  Yes Carl Samuels MD   oxyCODONE-acetaminophen (PERCOCET) 5-325 mg per tablet Take 1 Tab by mouth every four (4) hours as needed for Pain. Max Daily Amount: 6 Tabs. 1/5/19   Alex Ramos NP   nortriptyline (PAMELOR) 25 mg capsule TAKE ONE CAPSULE BY MOUTH AT BEDTIME 3/17/17   Provider, Historical   pantoprazole (PROTONIX) 40 mg tablet TAKE 1 TABLET BY MOUTH DAILY 2/16/17   Provider, Historical   tiZANidine (ZANAFLEX) 4 mg tablet TAKE 2 TABLETS BY MOUTH AT BEDTIME 2/16/17   Provider, Historical   nortriptyline (PAMELOR) 50 mg capsule Take 50 mg by mouth nightly. Other, MD Eileen   pantoprazole (PROTONIX) 20 mg tablet Take 20 mg by mouth daily. Provider, Historical     No Known Allergies   Social History     Tobacco Use    Smoking status: Current Every Day Smoker     Packs/day: 1.50     Years: 25.00     Pack years: 37.50     Types: Cigarettes    Smokeless tobacco: Never Used   Substance Use Topics    Alcohol use: No     Alcohol/week: 0.0 oz      Family History   Problem Relation Age of Onset    Hypertension Father     Diabetes Father     Gout Father     Alcohol abuse Sister     Hypertension Brother     Gout Brother         Subjective:      Connor Weiss is a 48 y.o. RHWM who has a long history of cervical dystonia and receives Botox treatment. Per patient condition started about 14 to 15 years PTC. He was having significant neck and shoulder stiffness and tightness. He would feel his muscles be bumpy. He has been on muscle relaxant but feels tizanidine helps best. He has been to physical therapy for his neck one time for a month without significant benefit. This would also trigger severe daily headaches. He was evaluated by Dr. Priyanka Griffith last 6/12/08 and diagnosed with cervical dystonia. He was started on chemodenervation with Botox in 2008. He reports significant benefit 1 week after the injection. Headaches resolve, neck and shoulder tension releases. Muscles smoothen out. Benefit would last for about 2 1/2 months and the headaches would start up followed by the muscle pains. He describes the headaches as gradual onset, frontal, pulling feeling that is constant and associated with nausea. No other associated s/sx. Botox treatments have afforded significant relief. Patient was previously seen at [facility] [place] [address] in 2010 and was undergoing chemodenervation treatment with Botox 200 units under EMG guidance every 3 months. Last Botox treatment at [facility] was 1/13/2014 and he had to move to another neurologist due to having new insurance.  Then he was also taking   Baclofen 10 mg TID, as needed Diazepam 5 mg and Midrin for abortive therapy for his headaches. The following muscles were treated:  Site       Units  Left levator scapulae    20  Right levator scapulae    20  Left posterior scalene    20  Right posterior scalene    20  Left splenius capitis     10  Right splenius capitis    10  Left SCM      10  Right SCM      10  Right trapezius     20  Left trapezius     20  Left longissimus     10  Right longissimus     10  Left splenius cervices    10  Right splenius cervices    10        Total 200        Wastage  0 units    Patient reports he was then undergoing Botox treatments under Dr. Pam Vasquez and when he decided to stop outpatient clinic, he was moved to Dr. Linnea Bhatia. He reports excellent benefit with Dr. Pam Vasquez but no as good a benefit with Dr. Linnea Bhatia. Then last 5/8/2018, he was seen at [facility] neurology by Dr. Dima Madrid. Note mentions his last Botox treatment with Dr. Linnea Bhatia was March 2018. He developed neck weakness for 2 weeks but it did offer relief of his headaches and neck pain. Patient mentions being tried on rizatriptan, sumatriptan, topiramate, beta-blockers, gabapentin, nortriptyline and chiropractic manipulation for his headaches. Note mentions patient beginning to have muscle spasms of the right side of his neck. He wanted his Botox injections to be transferred to [facility]. PT was ordered for evaluation for possible TENS unit for his neck pain and headaches secondary to cervical dystonia. Patient eventually did get Botox treatment at [facility] on 5/31/2018, 8/30/2018 and 11/29/2018. Last treatment note mentions decrease headache frequency badly 75%. Botox treatment was being done for chronic migraines. Review of muscles injected reveals that he was getting the treatments using the chronic migraine protocol with added dosing to the right and left temporalis, right and left occipitalis and right and left trapezius for a total of 200 units.   Patient was also given trigger point injections with bupivacaine and Xylocaine at 4 sites in the left trapezius and right trapezius and 2 sites on the left cervical right cervical paraspinals. Patient reports most recent round of Botox offered last relief and short-term benefit. He reports daily burning and tightening pain of the neck and shoulder muscles. He does admit for the past 3 weeks it seems to be less intense since undergoing physical therapy. Patient continues to also have intermittent headaches. He sparingly takes hydrocodone/APAP for the worst of his headaches which offers relief. This is prescribed by his PCP. His next treatment is supposed to be due on February 24, 2018. Outside reports reviewed: office notes, historical medical records. Review of Systems:    A comprehensive review of systems was performed:   Constitutional: positive for none  Eyes: positive for visual disturbance   Ears, nose, mouth, throat, and face: positive for none  Respiratory: positive for none  Cardiovascular: positive for none  Gastrointestinal: positive for nausea   Genitourinary: positive for none  Integument/breast: positive for none  Hematologic/lymphatic: positive for none  Musculoskeletal: positive for neck pain   Neurological: positive for headaches, dizziness  Behavioral/Psych: positive for none  Endocrine: positive for none  Allergic/Immunologic: positive for none      Objective:     Visit Vitals  /70   Pulse 88   Ht 6' (1.829 m)   Wt 237 lb (107.5 kg)   SpO2 97%   BMI 32.14 kg/m²       PHYSICAL EXAM:    General Appearance: Alert, patient appears stated age. General:  Well developed, well nourished, patient in no apparent distress. Head/Face: The head is normocephalic and atraumatic. Eyes: Conjunctivae appear normal. Sclera appear normal and non-icteric. Nose (and Sinus):   No abnormality of the nose or sinuses is noted. Oral:   Throat is clear. Lymphatics:  No lymphadenopathy in the neck/head. Neck and Thyroid:   No bruits noted in the neck. Respiratory:  Lungs clear to auscultation. Cardiovascular:  Palpation and auscultation: regular rate and rhythm. Extremity: No joint swelling, erythema or pedal edema. NEUROLOGICAL EXAM:    Appearance: The patient is well developed, well nourished, provides a coherent history and is in no acute distress. Mental Status: Oriented to time, place and person. Fluent, no aphasia or dysarthria. Mood and affect appropriate. Cranial Nerves:   Intact visual fields. RENAE, EOM's full, no nystagmus, no ptosis. Facial sensation is normal. Corneal reflexes are intact. Facial movement is symmetric. Hearing is normal bilaterally. Palate is midline with normal elevation. Sternocleidomastoid and trapezius muscles are normal. Tongue is midline. Motor:  5/5 strength in upper and lower proximal and distal muscles. Normal bulk and tone. No fasciculations. No pronator drift. Reflexes:   Deep tendon reflexes 2+/4 and symmetrical. Downgoing toes. Sensory:   Normal to cold, pinprick and vibration. Gait:  Normal gait. No Romberg. Can do tandem walking. Tremor:   No tremor noted. Cerebellar:  Intact FTN/JOAQUÍN/HTS. Neurovascular:  No carotid bruits. Cervical range of motion measurement:       Degrees   Head flexion   60   Head extension  50   Right lateral head flexion 38   Left lateral head flexion 35   Right head rotation  75   Left head rotation  75    Mild restrictions in extension, bilateral lateral head flexion and bilateral head rotation. Anterior head posture (2 FB off shoulder) with shoulders rotated in  (+) tenderness on palpation bilateral occiput, neck and shoulder muscles      Assessment:   Cervical dystonia  Chronic migraine headache    Plan:   Neurological examination reveals findings typically seen in cervical dystonia. Head and neck range of motion measurements revealed mild restrictions in all directions except head flexion.   Patient has an anterocollis head posture. Patient was advised to correct his anterior head posture. Use headrest at home, at work and while driving. Use wireless headsets. Do not carry anything on the shoulder. Use only one pillow at most when sleeping. Continue neck and shoulder exercises. Continue physical therapy. Patient has previously failed conservative management with medication and therapies. Patient has a long standing history of cervical dystonia with excellent response to intermittent chemodenervation with Botox treatment especially under EMG guidance at 200 units. I will go ahead and obtain an authorization to transfer his Botox treatments here. Botox 200 units every 3 months was ordered. Patient was also advised to continue taking baclofen 10 mg 3 times daily if necessary. Patient's cervical dystonia triggers his migraine headaches. Treatment as above offers relief for his headaches. Continue current abortive therapy for his headaches. Potential future trials of emerging treatments for migraines such as a Aimovig or Ajovy. All questions and concerns were answered. Visit lasted 60 minutes.   Greater than 50% was spent reviewing his medical records as summarized above, discussion about his condition, etiology, prognosis, continued treatment with Botox under EMG guidance, continue neck and shoulder exercises, continue posture changes, continue physical therapy, treatment options, medication

## 2019-01-31 ENCOUNTER — TELEPHONE (OUTPATIENT)
Dept: NEUROLOGY | Age: 54
End: 2019-01-31

## 2019-01-31 NOTE — TELEPHONE ENCOUNTER
Re: Botox for Cervical Dystonia G24.3    Called [facility] - \"Rudolph MCMAHAN"  At [phone] stated no PA is required but recommended a Pre-Determination - which can take up to 15 business days. Or he said can [facility] urgent for sooner and put date needed by. I set up  via [address] and Group routed it directly to his specialty pharmacy for review ([facility]) w/ copy of 1/28/19 office note attached. Total time on phone: 42 minutes. Status for both codes - pending. Faxed copy of Rx to [facility] w/ copy of insurance card and top copy of the pre-d (as an fyi).

## 2019-02-04 ENCOUNTER — TELEPHONE (OUTPATIENT)
Dept: NEUROLOGY | Age: 54
End: 2019-02-04

## 2019-02-04 NOTE — TELEPHONE ENCOUNTER
Re: Botox     Rec'd fax from [address] - they do not process Botox. Called them and was told [facility] ([facility] handles this account). Called [facility] in [address] - s/w Justyna, pharmacist - he set a Key in [facility] - submitted w/ copy of Dr. Nir Reid office notes. The [id number] has been sent to [facility] for Pre-D. Called [facility] to verify if their Charliene Deangelo has been cancelled w/ insurance. S/w one of the nurses and she will let auth rep know and took my number to return call. Called pt, he stated he has called [facility] several times to check on this but no return call so he's not sure if it has been done. I reviewed the Botox process with him and gave him my dd#. He appreciated the call. I told him once we get auth, I would let him know.

## 2019-02-13 ENCOUNTER — TELEPHONE (OUTPATIENT)
Dept: NEUROLOGY | Age: 54
End: 2019-02-13

## 2019-02-14 ENCOUNTER — TELEPHONE (OUTPATIENT)
Dept: NEUROLOGY | Age: 54
End: 2019-02-14

## 2019-02-15 ENCOUNTER — TELEPHONE (OUTPATIENT)
Dept: NEUROLOGY | Age: 54
End: 2019-02-15

## 2019-02-15 NOTE — TELEPHONE ENCOUNTER
Called [facility] - s/w Shaun Sampson, pharmacist - she has rec'd Botox Rx via fax - confirmed receipt of my fax this morning. She will try to get it shipped on Tues for Wed delivery. They need to verify benefits and call pt to get his consent.

## 2019-02-15 NOTE — TELEPHONE ENCOUNTER
Re: Botox - vm from [facility] at [facility] (yesterday at 3:30 pm). Maria Guadalupe cannot process it under medical (as she had previously stated on 2/13/19). [facility] says to call [facility] and can process it since it is approved under medical benefit.)    Faxed to [facility] :     Copy of Rx  Copy of insurance card  Demographics  Auth information     Marked to expedite - patient is rescheduled for March 8th. (rec'd vm from Yunior, pharmacist at [place], [place]  - to advise of same).

## 2019-02-21 ENCOUNTER — TELEPHONE (OUTPATIENT)
Dept: NEUROLOGY | Age: 54
End: 2019-02-21

## 2019-02-21 NOTE — TELEPHONE ENCOUNTER
Dr. Miriam Morrison,     I also confirmed the Botox is to be delivered here tomorrow. Litzy Paez says she will call you when it has arrived in the office and she will let patient know it has arrived.

## 2019-02-21 NOTE — TELEPHONE ENCOUNTER
----- Message from Sandie Raza sent at 2/21/2019  2:59 PM EST -----  Regarding: Dr. Alan Angel with [facility] is requesting a call back to schedule the patient's Botox delivery. ([phone] ext.  [phone]

## 2019-02-22 ENCOUNTER — DOCUMENTATION ONLY (OUTPATIENT)
Dept: NEUROLOGY | Age: 54
End: 2019-02-22

## 2019-02-22 ENCOUNTER — OFFICE VISIT (OUTPATIENT)
Dept: NEUROLOGY | Age: 54
End: 2019-02-22

## 2019-02-22 ENCOUNTER — TELEPHONE (OUTPATIENT)
Dept: NEUROLOGY | Age: 54
End: 2019-02-22

## 2019-02-22 VITALS
BODY MASS INDEX: 31.97 KG/M2 | HEART RATE: 80 BPM | SYSTOLIC BLOOD PRESSURE: 142 MMHG | HEIGHT: 72 IN | DIASTOLIC BLOOD PRESSURE: 82 MMHG | WEIGHT: 236 LBS | OXYGEN SATURATION: 98 %

## 2019-02-22 DIAGNOSIS — G24.3 CERVICAL DYSTONIA: Primary | ICD-10-CM

## 2019-02-22 NOTE — PROCEDURES
Neurology Chemodenervation Note    CC: neck and shoulder pain, headaches    HPI:  Ant Hodges is a 48 y.o. RHWM who has a long history of cervical dystonia and receives Botox treatment. Per patient condition started about 14 to 15 years PTC. He was having significant neck and shoulder stiffness and tightness. He would feel his muscles be bumpy. Botox treatment would offered  significant benefit 1 week after the injection. Headaches resolve, neck and shoulder tension releases. Muscles smoothen out. Benefit would last for about 2 1/2 months and the headaches would start up followed by the muscle pains. He is here for another treatment with Botox. Visit Vitals  /82   Pulse 80   Ht 6' (1.829 m)   Wt 236 lb (107 kg)   SpO2 98%   BMI 32.01 kg/m²       Cervical range of motion measurement:                                                              Degrees              Head flexion                            60              Head extension                       50              Right lateral head flexion        38              Left lateral head flexion          35              Right head rotation                 75              Left head rotation                    75     Mild restrictions in extension, bilateral lateral head flexion and bilateral head rotation. Anterior head posture (2 FB off shoulder) with shoulders rotated in  (+) tenderness on palpation bilateral occiput, neck and shoulder muscles    Procedure note:  Patient is here to continue treatment with Botox. Patient experienced fair results last time with no side effects. After discussing the risks, benefits and alternatives for this procedure consent was obtained. 200 units of botulinum toxin reconstituted with bacteriostatic normal saline were injected intramuscularly into the neck and shoulder muscles bilaterally under EMG guidance with 0 units of wastage. Patient tolerated the procedure well. Postprocedure care was discussed. Patient instructed to follow-up in 1 month. Assessment: Cervical dystonia - worsening    PLAN:   Informed consent was obtained. Botulinum toxin type A 200 units were reconstituted in 2 ml of preservative free normal saline to a dose of 100 units per ml. Aseptic technique was utilized. Area cleansed with alcohol. Needle EMG guidance was used to localized the muscles involved, optimize treatment and prevent complications. The following muscles were treated:  Site       Units  Left levator scapulae    20  Right levator scapulae    20  Left posterior scalene    20  Right posterior scalene    20  Left splenius capitis     10  Right splenius capitis    10  Left SCM      10  Right SCM      10  Right trapezius     10  Left trapezius     10  Right semispinalis capitis    10  Left semispinalis capitis    10  Left longissimus     10  Right longissimus     10  Left splenius cervices    10  Right splenius cervices    10        Total 200        Wastage  0 units      Lot. No.  C3  Exp. Date  06/2021    No immediate complications were encountered. Pressure applied to all sites. Patient tolerated the procedure well and was released to home. The patient was advised of the possibility of some soreness/redness, etc. at the injection sites and to report if he develops any significant problems. If develops problems with swallowing or breathing patient is advised to go to the nearest emergency room.

## 2019-02-22 NOTE — PROGRESS NOTES
botox came in the office: 2/22/19  MFR: bella  LOT: [id number]  Exp: 6/2021  Appointment: 2/22/19  Specialty pharmacy: [facility]

## 2019-02-27 ENCOUNTER — TELEPHONE (OUTPATIENT)
Dept: NEUROLOGY | Age: 54
End: 2019-02-27

## 2019-02-27 NOTE — TELEPHONE ENCOUNTER
Re: Botox     Rec'd mailed copy from [facility] ( [phone]    Request ID [id number]     approval dates 2/13/19 - 8/13/2020 for 6 visits.

## 2019-03-21 ENCOUNTER — OFFICE VISIT (OUTPATIENT)
Dept: NEUROLOGY | Age: 54
End: 2019-03-21

## 2019-03-21 VITALS
WEIGHT: 230 LBS | DIASTOLIC BLOOD PRESSURE: 90 MMHG | SYSTOLIC BLOOD PRESSURE: 124 MMHG | BODY MASS INDEX: 31.15 KG/M2 | OXYGEN SATURATION: 96 % | HEIGHT: 72 IN | HEART RATE: 98 BPM

## 2019-03-21 DIAGNOSIS — G43.709 CHRONIC MIGRAINE WITHOUT AURA WITHOUT STATUS MIGRAINOSUS, NOT INTRACTABLE: ICD-10-CM

## 2019-03-21 DIAGNOSIS — G24.3 CERVICAL DYSTONIA: Primary | ICD-10-CM

## 2019-03-21 NOTE — PROGRESS NOTES
NEUROLOGY CLINIC NOTE Patient ID: 
Clint Mayfield [id number] 
38 y.o. 
1965 Date of Visit:  March 21, 2019 Reason for Visit:  Neck pain and headaches, post Botox Chief Complaint Patient presents with  Follow-up  
  took about a week to work but no headaches. Had one yesterday. History of Present Illness:  
 
Patient Active Problem List  
 Diagnosis Date Noted  SOB (shortness of breath) 10/12/2017 Past Medical History:  
Diagnosis Date  Cervical dystonia  Headache  Hypertension Past Surgical History:  
Procedure Laterality Date  HX ORTHOPAEDIC Left Shoulder Prior to Admission medications Medication Sig Start Date End Date Taking?  Authorizing Provider  
atorvastatin (LIPITOR) 80 mg tablet  1/1/19  Yes Provider, Historical  
albuterol (PROVENTIL HFA, VENTOLIN HFA, PROAIR HFA) 90 mcg/actuation inhaler ProAir HFA 90 mcg/actuation aerosol inhaler   Yes Provider, Historical  
metoprolol succinate (TOPROL-XL) 50 mg XL tablet  12/15/18  Yes Provider, Historical  
pantoprazole (PROTONIX) 40 mg tablet pantoprazole 40 mg tablet,delayed release   Yes Provider, Historical  
onabotulinumtoxinA (BOTOX) 200 unit injection Inject 200 units intramuscularly to the bilateral neck and shoulder muscles every 3 months 1/28/19  Yes Artie Bill MD  
aspirin 81 mg chewable tablet  12/30/18   Provider, Historical  
baclofen (LIORESAL) 10 mg tablet baclofen 10 mg tablet    Provider, Historical  
colchicine 0.6 mg tablet colchicine 0.6 mg tablet    Provider, Historical  
DEXILANT 30 mg capsule  12/6/18   Provider, Historical  
ULORIC 40 mg tab tablet  12/27/18   Provider, Historical  
HYDROcodone-acetaminophen (NORCO)  mg tablet  12/5/18   Provider, Historical  
chlorthalidone (HYGROTEN) 25 mg tablet TAKE 1 TABLET BY MOUTH EVERY MORNING 2/20/17   Provider, Historical  
DULERA 200-5 mcg/actuation HFA inhaler TAKE 2 PUFFS BY MOUTH EVERY 12 HOURS 5/2/17   Provider, Historical  
 
Allergies Allergen Reactions  Medrol [Methylprednisolone] Other (comments) psychosis Social History Tobacco Use  Smoking status: Current Every Day Smoker Packs/day: 1.50 Years: 25.00 Pack years: 37.50 Types: Cigarettes  Smokeless tobacco: Never Used Substance Use Topics  Alcohol use: No  
  Alcohol/week: 0.0 oz Family History Problem Relation Age of Onset  Hypertension Father  Diabetes Father  Gout Father  Alcohol abuse Sister  Hypertension Brother  Gout Brother Subjective:  
  
Idalmis Telles is a 48 y.o. RHWM who has a long history of cervical dystonia and receives Botox treatment. Per patient condition started about 14 to 15 years PTC. He was having significant neck and shoulder stiffness and tightness. He would feel his muscles be bumpy. He has been on muscle relaxant but feels tizanidine helps best. He has been to physical therapy for his neck one time for a month without significant benefit. This would also trigger severe daily headaches. He was evaluated by Dr. Stella Argueta last 6/12/08 and diagnosed with cervical dystonia. He was started on chemodenervation with Botox in 2008. He reports significant benefit 1 week after the injection. Headaches resolve, neck and shoulder tension releases. Muscles smoothen out. Benefit would last for about 2 1/2 months and the headaches would start up followed by the muscle pains. He describes the headaches as gradual onset, frontal, pulling feeling that is constant and associated with nausea. No other associated s/sx. Botox treatments have afforded significant relief. Patient was previously seen at [facility] [place] [address] in 2010 and was undergoing chemodenervation treatment with Botox 200 units under EMG guidance every 3 months.  Last Botox treatment at [facility] was 1/13/2014 and he had to move to another neurologist due to having new insurance. Then he was also taking Baclofen 10 mg TID, as needed Diazepam 5 mg and Midrin for abortive therapy for his headaches. The following muscles were treated: 
Site       Units Left levator scapulae    20 Right levator scapulae    20 Left posterior scalene    20 Right posterior scalene    20 Left splenius capitis     10 Right splenius capitis    10 Left SCM      10 Right SCM      10 Right trapezius     20 Left trapezius     20 Left longissimus     10 Right longissimus     10 Left splenius cervices    10 Right splenius cervices    10 Total 200 Wastage  0 units Patient reports he was then undergoing Botox treatments under Dr. Elizabeth Santiago and when he decided to stop outpatient clinic, he was moved to Dr. Sukhi Cummings. He reports excellent benefit with Dr. Elizabeth Santiago but no as good a benefit with Dr. Sukhi Cummings. Then last 5/8/2018, he was seen at [facility] neurology by Dr. Lenore Almonte. Note mentions his last Botox treatment with Dr. Sukhi Cummings was March 2018. He developed neck weakness for 2 weeks but it did offer relief of his headaches and neck pain. Patient mentions being tried on rizatriptan, sumatriptan, topiramate, beta-blockers, gabapentin, nortriptyline and chiropractic manipulation for his headaches. Note mentions patient beginning to have muscle spasms of the right side of his neck. He wanted his Botox injections to be transferred to [facility]. PT was ordered for evaluation for possible TENS unit for his neck pain and headaches secondary to cervical dystonia. Patient eventually did get Botox treatment at [facility] on 5/31/2018, 8/30/2018 and 11/29/2018. Last treatment note mentions decrease headache frequency badly 75%. Botox treatment was being done for chronic migraines.   Review of muscles injected reveals that he was getting the treatments using the chronic migraine protocol with added dosing to the right and left temporalis, right and left occipitalis and right and left trapezius for a total of 200 units. Patient was also given trigger point injections with bupivacaine and Xylocaine at 4 sites in the left trapezius and right trapezius and 2 sites on the left cervical right cervical paraspinals. Patient reports most recent round of Botox offered last relief and short-term benefit. He reports daily burning and tightening pain of the neck and shoulder muscles. He does admit for the past 3 weeks it seems to be less intense since undergoing physical therapy. Patient continues to also have intermittent headaches. He sparingly takes hydrocodone/APAP for the worst of his headaches which offers relief. This is prescribed by his PCP. His next treatment is supposed to be due on February 24, 2018. Since the last visit on 2/22/2019, patient underwent chemodenervation with Botox 200 units under EMG guidance for cervical dystonia. Patient reports benefit after 1 week. Mild weakness that resolved after a week as well. Pain and headache free until yesterday. Patient apparently stopped his Baclofen as he was feeling better. Did a lot manual labor the past several days and started to experience burning pain in his neck. Restarted Baclofen yesterday with benefit. He is happy with his progress. Outside reports reviewed: none Review of Systems: A comprehensive review of systems was performed: Musculoskeletal: positive for neck pain Objective:  
 
Visit Vitals /90 Pulse 98 Ht 6' (1.829 m) Wt 230 lb (104.3 kg) SpO2 96% BMI 31.19 kg/m² PHYSICAL EXAM: 
 
NEUROLOGICAL EXAM: 
 
Appearance: The patient is well developed, well nourished, provides a coherent history and is in no acute distress. Mental Status: Oriented to time, place and person. Fluent, no aphasia or dysarthria. Mood and affect appropriate. Cranial Nerves:   II - XII were intact. Motor:  5/5 strength. Normal tone. No pronator drift. Reflexes:   Deep tendon reflexes 2+/4 and symmetrical. Downgoing toes. Sensory:   Normal to cold, pinprick and vibration. Gait:  Normal gait. No Romberg. Can do tandem walking. Tremor:   No tremor noted. Cerebellar:  Intact FTN/JOAQUÍN/HTS. Cervical range of motion measurement: 
     Degrees Head flexion   70 (60) Head extension  58 (50) Right lateral head flexion 45 (38) Left lateral head flexion 48 (35) Right head rotation  85 (75) Left head rotation  85 (75) No restrictions Anterior head posture (2 FB off shoulder) with shoulders rotated in 
(+) less tenderness on palpation bilateral occiput, neck and shoulder muscles Assessment:  
Cervical dystonia Chronic migraine headache Plan:  
Neurological examination reveals findings typically seen in cervical dystonia. Head and neck range of motion measurements revealed improvement post-Botox with no restrictions. Patient has an anterocollis head posture. Patient was encouraged to continue to correct his anterior head posture. Use headrest at home, at work and while driving. Use wireless headsets. Do not carry anything on the shoulder. Use only one pillow at most when sleeping. Continue neck and shoulder exercises. Patient has previously failed conservative management with medication and therapies. Continue chemodenervation with Botox 200 units EMG guidance every 3 months. Patient was also advised to continue taking baclofen 10 mg 3 times daily to maximize benefit from Botox. Patient's cervical dystonia triggers his migraine headaches. Treatment as above offers relief for his headaches. Continue current abortive therapy for his headaches. Potential future trials of emerging treatments for migraines such as a Aimovig or Ajovy. All questions and concerns were answered.

## 2019-03-21 NOTE — LETTER
3/21/19 Patient: Faisal Dunn YOB: 1965 Date of Visit: 3/21/2019 Denis Saleem MD 
[address] [facility] [address] [id number] VIA Facsimile: [phone] Dear Denis Saleem MD, Thank you for referring Mr. Mark Fenton to [address] [place] [address] for evaluation. My notes for this consultation are attached. If you have questions, please do not hesitate to call me. I look forward to following your patient along with you. Sincerely, Skyler Ontiveros MD

## 2019-03-21 NOTE — PATIENT INSTRUCTIONS
Migraine Headache: Care Instructions Your Care Instructions Migraines are painful, throbbing headaches that often start on one side of the head. They may cause nausea and vomiting and make you sensitive to light, sound, or smell. Without treatment, migraines can last from 4 hours to a few days. Medicines can help prevent migraines or stop them after they have started. Your doctor can help you find which ones work best for you. Follow-up care is a key part of your treatment and safety. Be sure to make and go to all appointments, and call your doctor if you are having problems. It's also a good idea to know your test results and keep a list of the medicines you take. How can you care for yourself at home? · Do not drive if you have taken a prescription pain medicine. · Rest in a quiet, dark room until your headache is gone. Close your eyes, and try to relax or go to sleep. Don't watch TV or read. · Put a cold, moist cloth or cold pack on the painful area for 10 to 20 minutes at a time. Put a thin cloth between the cold pack and your skin. · Use a warm, moist towel or a heating pad set on low to relax tight shoulder and neck muscles. · Have someone gently massage your neck and shoulders. · Take your medicines exactly as prescribed. Call your doctor if you think you are having a problem with your medicine. You will get more details on the specific medicines your doctor prescribes. · Be careful not to take pain medicine more often than the instructions allow. You could get worse or more frequent headaches when the medicine wears off. To prevent migraines · Keep a headache diary so you can figure out what triggers your headaches. Avoiding triggers may help you prevent headaches. Record when each headache began, how long it lasted, and what the pain was like.  (Was it throbbing, aching, stabbing, or dull?) Write down any other symptoms you had with the headache, such as nausea, flashing lights or dark spots, or sensitivity to bright light or loud noise. Note if the headache occurred near your period. List anything that might have triggered the headache. Triggers may include certain foods (chocolate, cheese, wine) or odors, smoke, bright light, stress, or lack of sleep. · If your doctor has prescribed medicine for your migraines, take it as directed. You may have medicine that you take only when you get a migraine and medicine that you take all the time to help prevent migraines. ? If your doctor has prescribed medicine for when you get a headache, take it at the first sign of a migraine, unless your doctor has given you other instructions. ? If your doctor has prescribed medicine to prevent migraines, take it exactly as prescribed. Call your doctor if you think you are having a problem with your medicine. · Find healthy ways to deal with stress. Migraines are most common during or right after stressful times. Take time to relax before and after you do something that has caused a migraine in the past. 
· Try to keep your muscles relaxed by keeping good posture. Check your jaw, face, neck, and shoulder muscles for tension. Try to relax them. When you sit at a desk, change positions often. And make sure to stretch for 30 seconds each hour. · Get plenty of sleep and exercise. · Eat meals on a regular schedule. Avoid foods and drinks that often trigger migraines. These include chocolate, alcohol (especially red wine and port), aspartame, monosodium glutamate (MSG), and some additives found in foods (such as hot dogs, schmitz, cold cuts, aged cheeses, and pickled foods). · Limit caffeine. Don't drink too much coffee, tea, or soda. But don't quit caffeine suddenly. That can also give you migraines. · Do not smoke or allow others to smoke around you. If you need help quitting, talk to your doctor about stop-smoking programs and medicines. These can increase your chances of quitting for good. · If you are taking birth control pills or hormone therapy, talk to your doctor about whether they are triggering your migraines. When should you call for help? Call 911 anytime you think you may need emergency care. For example, call if: 
  · You have signs of a stroke. These may include: 
? Sudden numbness, paralysis, or weakness in your face, arm, or leg, especially on only one side of your body. ? Sudden vision changes. ? Sudden trouble speaking. ? Sudden confusion or trouble understanding simple statements. ? Sudden problems with walking or balance. ? A sudden, severe headache that is different from past headaches.  
 Call your doctor now or seek immediate medical care if: 
  · You have new or worse nausea and vomiting.  
  · You have a new or higher fever.  
  · Your headache gets much worse.  
 Watch closely for changes in your health, and be sure to contact your doctor if: 
  · You are not getting better after 2 days (48 hours). Where can you learn more? Go to http://chi-jovita.info/. Enter I482 in the search box to learn more about \"Migraine Headache: Care Instructions. \" Current as of: Adia 3, 2018 Content Version: 11.9 © 4735-9090 [employer]. Care instructions adapted under license by [employer] (which disclaims liability or warranty for this information). If you have questions about a medical condition or this instruction, always ask your healthcare professional. [place] [address] any warranty or liability for your use of this information. Torticollis: Care Instructions Your Care Instructions Torticollis is a severe tightness of the muscles on one side of the neck. The tight muscles can make the head turn to one side, lean to one side, or be pulled forward or backward. It is also called wryneck. Your doctor asked questions about your health and examined you. You may also have had X-rays or other tests. If your doctor thinks another medical problem is causing your tight neck muscles, you may need more tests. Torticollis usually gets better with home care. Your doctor may have you take medicine to relieve pain or relax your muscles. He or she may suggest exercise and physical therapy to help increase flexibility and relieve stress. Your doctor may also have you wear a special collar, called a cervical collar, for a day or two. The collar may help make your neck more comfortable. Follow-up care is a key part of your treatment and safety. Be sure to make and go to all appointments, and call your doctor if you are having problems. It's also a good idea to know your test results and keep a list of the medicines you take. How can you care for yourself at home? · Be safe with medicines. Read and follow all instructions on the label. ? If the doctor gave you a prescription medicine for pain, take it as prescribed. ? If you are not taking a prescription pain medicine, ask your doctor if you can take an over-the-counter medicine. · Try using a heating pad on a low or medium setting for 15 to 20 minutes every 2 or 3 hours. Try a warm shower in place of one session with the heating pad. · Try using an ice pack for 10 to 15 minutes every 2 to 3 hours. Put a thin cloth between the ice and your skin. · If your doctor recommends a cervical collar, wear it exactly as directed. When should you call for help? Call your doctor now or seek immediate medical care if: 
  · You have new or worse numbness in your arms, buttocks, or legs.  
  · You have new or worse weakness in your arms or legs.  
  · Your neck pain gets worse.  
  · You lose bladder or bowel control.  
 Watch closely for changes in your health, and be sure to contact your doctor if: 
  · You do not get better as expected. Where can you learn more? Go to http://chi-jovita.info/. Enter I477 in the search box to learn more about \"Torticollis: Care Instructions. \" Current as of: September 20, 2018 Content Version: 11.9 © 6967-1329 [employer]. Care instructions adapted under license by [employer] (which disclaims liability or warranty for this information). If you have questions about a medical condition or this instruction, always ask your healthcare professional. [address] any warranty or liability for your use of this information.

## 2019-05-14 ENCOUNTER — DOCUMENTATION ONLY (OUTPATIENT)
Dept: NEUROLOGY | Age: 54
End: 2019-05-14

## 2019-05-14 NOTE — PROGRESS NOTES
botox came in the office: 5/14/19  MFR: bella  LOT: [id number]  Exp: 12/2021  Appointment:5/24/19  Specialty pharmacy:[facility]  Provider: DR Chris Spencer

## 2019-05-24 ENCOUNTER — OFFICE VISIT (OUTPATIENT)
Dept: NEUROLOGY | Age: 54
End: 2019-05-24

## 2019-05-24 VITALS
HEIGHT: 73 IN | OXYGEN SATURATION: 98 % | BODY MASS INDEX: 30.22 KG/M2 | DIASTOLIC BLOOD PRESSURE: 70 MMHG | SYSTOLIC BLOOD PRESSURE: 135 MMHG | WEIGHT: 228 LBS | HEART RATE: 82 BPM

## 2019-05-24 DIAGNOSIS — G24.3 CERVICAL DYSTONIA: Primary | ICD-10-CM

## 2019-05-24 NOTE — PATIENT INSTRUCTIONS

## 2019-05-24 NOTE — PROCEDURES
Neurology Chemodenervation Note    CC: neck and shoulder pain, headaches    HPI:  Allie Porter is a 48 y.o. RHWM who has a long history of cervical dystonia and receives Botox treatment. Per patient condition started about 14 to 15 years PTC. He was having significant neck and shoulder stiffness and tightness. He would feel his muscles be bumpy. Botox treatment would offered significant benefit 1 week after the injection. Headaches resolve, neck and shoulder tension releases. Muscles smoothen out. Benefit would last for about 2 1/2 months and the headaches would start up followed by the muscle pains. Since his last Botox treatment on 2/22/2019, he reports benefit after 1 week. Mild weakness that resolved after a week as well. Pain and headache free. Benefit waned 2 weeks PTC with emergence of neck pain and headaches. PCP increase his Baclofen dose and he was given Toradol shots and Medrol dosepak with benefit. He is here for another treatment with Botox. Visit Vitals  /70   Pulse 82   Ht 6' 1\" (1.854 m)   Wt 228 lb (103.4 kg)   SpO2 98%   BMI 30.08 kg/m²     Procedure note:  Patient is here to continue treatment with Botox. Patient experienced excellent results last time with no significant side effects. After discussing the risks, benefits and alternatives for this procedure consent was obtained. 200 units of botulinum toxin reconstituted with bacteriostatic normal saline were injected intramuscularly into the neck and shoulder muscles bilaterally under EMG guidance with 0 units of wastage. Patient tolerated the procedure well. Postprocedure care was discussed. Patient instructed to follow-up in 1 month. Assessment: Cervical dystonia - worsening    PLAN:   Informed consent was obtained. Botulinum toxin type A 200 units were reconstituted in 2 ml of preservative free normal saline to a dose of 100 units per ml. Aseptic technique was utilized. Area cleansed with alcohol.   Needle EMG guidance was used to localized the muscles involved, optimize treatment and prevent complications. The following muscles were treated:  Site       Units  Left levator scapulae    20  Right levator scapulae    20  Left posterior scalene    20  Right posterior scalene    20  Left splenius capitis     10  Right splenius capitis    10  Left SCM      10  Right SCM      10  Right trapezius     10  Left trapezius     10  Right semispinalis capitis    10  Left semispinalis capitis    10  Left longissimus     10  Right longissimus     10  Left splenius cervices    10  Right splenius cervices    10        Total 200        Wastage  0 units      Lot. No.  C3  Exp. Date  12/2021    No immediate complications were encountered. Pressure applied to all sites. Patient tolerated the procedure well and was released to home. The patient was advised of the possibility of some soreness/redness, etc. at the injection sites and to report if he develops any significant problems. If develops problems with swallowing or breathing patient is advised to go to the nearest emergency room.

## 2019-08-07 ENCOUNTER — TELEPHONE (OUTPATIENT)
Dept: NEUROLOGY | Age: 54
End: 2019-08-07

## 2019-08-07 ENCOUNTER — DOCUMENTATION ONLY (OUTPATIENT)
Dept: NEUROLOGY | Age: 54
End: 2019-08-07

## 2019-08-07 NOTE — PROGRESS NOTES
Spoke with Bri Merritt at [facility] for botox delivery. They stated they had to attached the prescription to the order. They put it in as stat and should call back Friday.

## 2019-08-07 NOTE — TELEPHONE ENCOUNTER
Botox auth still valid     Botox  - Auth # [id number]   2/13/19 - 8/13/20   ([facility]). Botox [address]8557 - no PA or Pre-D is required per Tj Lilly of [address] [place] [address]. [facility] ph [phone]    Emailed Bassam Mosley/[facility] to set up shipment to be delivered by 8/20/19. Appt is 8/30/19. fyi to Hung.

## 2019-08-13 ENCOUNTER — DOCUMENTATION ONLY (OUTPATIENT)
Dept: NEUROLOGY | Age: 54
End: 2019-08-13

## 2019-08-13 NOTE — PROGRESS NOTES
Spoke with Ambreen Arguelles At [facility] botox is scheduled to arrive 8/20/2019

## 2019-08-22 ENCOUNTER — DOCUMENTATION ONLY (OUTPATIENT)
Dept: NEUROLOGY | Age: 54
End: 2019-08-22

## 2019-08-30 ENCOUNTER — OFFICE VISIT (OUTPATIENT)
Dept: NEUROLOGY | Age: 54
End: 2019-08-30

## 2019-08-30 VITALS
BODY MASS INDEX: 30.35 KG/M2 | HEIGHT: 73 IN | DIASTOLIC BLOOD PRESSURE: 80 MMHG | WEIGHT: 229 LBS | OXYGEN SATURATION: 98 % | SYSTOLIC BLOOD PRESSURE: 118 MMHG | HEART RATE: 88 BPM

## 2019-08-30 DIAGNOSIS — G24.3 CERVICAL DYSTONIA: Primary | ICD-10-CM

## 2019-09-03 NOTE — PROCEDURES
Neurology Chemodenervation Note    CC: neck and shoulder pain, headaches    HPI:  Kitty Sterling is a 48 y.o. RHWM who has a long history of cervical dystonia and receives Botox treatment. Per patient condition started about 14 to 15 years PTC. He was having significant neck and shoulder stiffness and tightness. He would feel his muscles be bumpy. Botox treatment would offered significant benefit 1 week after the injection. Headaches resolve, neck and shoulder tension releases. Muscles smoothen out. Benefit would last for about 2 1/2 months and the headaches would start up followed by the muscle pains. Since his last Botox treatment on 5/24/2019, he reports benefit that started after 1 week. Mild weakness that resolved after a week as well. Pain and headache free. Benefit waned 1 week PTC with emergence of neck pain and headaches. He is here for another treatment with Botox. Visit Vitals  /80   Pulse 88   Ht 6' 1\" (1.854 m)   Wt 229 lb (103.9 kg)   SpO2 98%   BMI 30.21 kg/m²     Procedure note:  Patient is here to continue treatment with Botox. Patient experienced excellent results last time with no significant side effects. After discussing the risks, benefits and alternatives for this procedure consent was obtained. 200 units of botulinum toxin reconstituted with bacteriostatic normal saline were injected intramuscularly into the neck and shoulder muscles bilaterally under EMG guidance with 0 units of wastage. Patient tolerated the procedure well. Postprocedure care was discussed. Patient instructed to follow-up in 3 months. Assessment: Cervical dystonia - worsening    PLAN:   Informed consent was obtained. Botulinum toxin type A 200 units were reconstituted in 2 ml of preservative free normal saline to a dose of 100 units per ml. Aseptic technique was utilized. Area cleansed with alcohol.   Needle EMG guidance was used to localized the muscles involved, optimize treatment and prevent complications. The following muscles were treated:  Site       Units  Left levator scapulae    20  Right levator scapulae    20  Left posterior scalene    20  Right posterior scalene    20  Left splenius capitis     10  Right splenius capitis    10  Left SCM      10  Right SCM      10  Right trapezius     10  Left trapezius     10  Right semispinalis capitis    10  Left semispinalis capitis    10  Left longissimus     10  Right longissimus     10  Left splenius cervices    10  Right splenius cervices    10        Total 200        Wastage  0 units      Lot. No.  C3  Exp. Date  03/2022    No immediate complications were encountered. Pressure applied to all sites. Patient tolerated the procedure well and was released to home. The patient was advised of the possibility of some soreness/redness, etc. at the injection sites and to report if he develops any significant problems. If develops problems with swallowing or breathing patient is advised to go to the nearest emergency room.

## 2019-11-05 ENCOUNTER — TELEPHONE (OUTPATIENT)
Dept: NEUROLOGY | Age: 54
End: 2019-11-05

## 2019-11-12 ENCOUNTER — DOCUMENTATION ONLY (OUTPATIENT)
Dept: NEUROLOGY | Age: 54
End: 2019-11-12

## 2019-11-12 NOTE — PROGRESS NOTES
botox came in the office: 11/12/2019  MFR: bella  LOT: [id number]  Exp: 4/2022  Appointment:[id number]  Specialty pharmacy:[facility]  Provider: Dr Tonio Nicolas

## 2019-11-29 ENCOUNTER — OFFICE VISIT (OUTPATIENT)
Dept: NEUROLOGY | Age: 54
End: 2019-11-29

## 2019-11-29 VITALS
WEIGHT: 230 LBS | BODY MASS INDEX: 30.48 KG/M2 | DIASTOLIC BLOOD PRESSURE: 96 MMHG | OXYGEN SATURATION: 97 % | SYSTOLIC BLOOD PRESSURE: 147 MMHG | HEIGHT: 73 IN | HEART RATE: 80 BPM

## 2019-11-29 DIAGNOSIS — G24.3 CERVICAL DYSTONIA: Primary | ICD-10-CM

## 2019-11-29 NOTE — PROCEDURES
Neurology Chemodenervation Note    CC: neck and shoulder pain, headaches    HPI:  Bharat Kent is a 48 y.o. RHWM who has a long history of cervical dystonia and receives Botox treatment. Per patient condition started about 14 to 15 years PTC. He was having significant neck and shoulder stiffness and tightness. He would feel his muscles be bumpy. Botox treatment would offered significant benefit 1 week after the injection. Headaches resolve, neck and shoulder tension releases. Muscles smoothen out. Benefit would last for about 2 1/2 months and the headaches would start up followed by the muscle pains. Since his last Botox treatment on 8/30/2019, he reports benefit that started after 1 week. Mild weakness that resolved after a week as well. Pain and headache free. Benefit waned 1 week PTC with emergence of neck pain and headaches. He is here for another treatment with Botox. Visit Vitals  BP (!) 147/96   Pulse 80   Ht 6' 1\" (1.854 m)   Wt 230 lb (104.3 kg)   SpO2 97%   BMI 30.34 kg/m²     Procedure note:  Patient is here to continue treatment with Botox. Patient experienced excellent results last time with no significant side effects. After discussing the risks, benefits and alternatives for this procedure consent was obtained. 200 units of botulinum toxin reconstituted with bacteriostatic normal saline were injected intramuscularly into the neck and shoulder muscles bilaterally under EMG guidance with 0 units of wastage. Patient tolerated the procedure well. Postprocedure care was discussed. Patient instructed to follow-up in 3 months. Assessment: Cervical dystonia - worsening    PLAN:   Informed consent was obtained. Botulinum toxin type A 200 units were reconstituted in 2 ml of preservative free normal saline to a dose of 100 units per ml. Aseptic technique was utilized. Area cleansed with alcohol.   Needle EMG guidance was used to localized the muscles involved, optimize treatment and prevent complications. The following muscles were treated:  Site       Units  Left levator scapulae    20  Right levator scapulae    20  Left posterior scalene    20  Right posterior scalene    20  Left splenius capitis     10  Right splenius capitis    10  Left SCM      10  Right SCM      10  Right trapezius     10  Left trapezius     10  Right semispinalis capitis    10  Left semispinalis capitis    10  Left longissimus     10  Right longissimus     10  Left splenius cervices    10  Right splenius cervices    10        Total 200        Wastage  0 units      Lot. No.  C3  Exp. Date  04/2022    No immediate complications were encountered. Pressure applied to all sites. Patient tolerated the procedure well and was released to home. The patient was advised of the possibility of some soreness/redness, etc. at the injection sites and to report if he develops any significant problems. If develops problems with swallowing or breathing patient is advised to go to the nearest emergency room.

## 2019-11-29 NOTE — PATIENT INSTRUCTIONS
OnabotulinumtoxinA (Botox, Botox Cosmetic) - (By injection)   Why this medicine is used:   Treats muscle stiffness and spasms, excessive sweating, overactive bladder, or loss of bladder control. Prevents chronic migraine headaches. Improves the appearance of wrinkles on the face. Contact a nurse or doctor right away if you have:  · Slow or uneven heartbeat, chest pain, trouble breathing, swallowing, or talking  · Change in how much or how often you urinate, trouble or painful urination  · Headache, increased sweating, warmth or redness in your face, neck, or arm  · Blurred or double vision, droopy eyelids     Common side effects:  · Fever, chills, cough, stuffy or runny nose, sore throat, and body aches  · Pain in your neck, back, arms, or legs, muscle weakness  © 2017 [address] is for End User's use only and may not be sold, redistributed or otherwise used for commercial purposes.

## 2020-02-20 ENCOUNTER — APPOINTMENT (OUTPATIENT)
Dept: GENERAL RADIOLOGY | Age: 55
End: 2020-02-20
Attending: EMERGENCY MEDICINE
Payer: COMMERCIAL

## 2020-02-20 ENCOUNTER — HOSPITAL ENCOUNTER (EMERGENCY)
Age: 55
Discharge: HOME | End: 2020-02-20
Attending: EMERGENCY MEDICINE
Payer: COMMERCIAL

## 2020-02-20 VITALS
TEMPERATURE: 98.4 F | WEIGHT: 220 LBS | DIASTOLIC BLOOD PRESSURE: 93 MMHG | SYSTOLIC BLOOD PRESSURE: 131 MMHG | HEIGHT: 72 IN | BODY MASS INDEX: 29.8 KG/M2 | HEART RATE: 96 BPM | RESPIRATION RATE: 20 BRPM | OXYGEN SATURATION: 96 %

## 2020-02-20 DIAGNOSIS — M25.551 RIGHT HIP PAIN: Primary | ICD-10-CM

## 2020-02-20 PROCEDURE — 96374 THER/PROPH/DIAG INJ IV PUSH: CPT

## 2020-02-20 PROCEDURE — 74011250636 HC RX REV CODE- 250/636: Performed by: EMERGENCY MEDICINE

## 2020-02-20 PROCEDURE — 99284 EMERGENCY DEPT VISIT MOD MDM: CPT

## 2020-02-20 PROCEDURE — 73502 X-RAY EXAM HIP UNI 2-3 VIEWS: CPT

## 2020-02-20 RX ORDER — FENTANYL CITRATE 50 UG/ML
50 INJECTION, SOLUTION INTRAMUSCULAR; INTRAVENOUS
Status: COMPLETED | OUTPATIENT
Start: 2020-02-20 | End: 2020-02-20

## 2020-02-20 RX ORDER — VARENICLINE TARTRATE 25 MG
KIT ORAL
COMMUNITY
End: 2022-05-11

## 2020-02-20 RX ADMIN — FENTANYL CITRATE 50 MCG: 50 INJECTION, SOLUTION INTRAMUSCULAR; INTRAVENOUS at 16:10

## 2020-02-20 NOTE — ED NOTES
Dr Waldo Dominique reviewed discharge instructions with the patient. The patient verbalized understanding. All questions and concerns were addressed. The patient declined a wheelchair and is discharged ambulatory in the care of family members with instructions and prescriptions in hand. Pt is alert and oriented x 4. Respirations are clear and unlabored.

## 2020-02-20 NOTE — ED PROVIDER NOTES
EMERGENCY DEPARTMENT HISTORY AND PHYSICAL EXAM      Date: 2/20/2020  Patient Name: Jonnie Bellamy    History of Presenting Illness     Chief Complaint   Patient presents with    Hip Pain     pt had rt hip replacement on 02/05. it dislocated today. ems says they may have reduced it when transferring him to ambulance. History Provided By: Patient    HPI: Jonnie Bellamy, 48 y.o. male with PMHx significant for recent right hip replacement on 2/5 performed by Dr. Adriel Fernandez who presents with a chief complaint of a hip dislocation. Patient states that he was sitting in the car and leaned over to try to get out and felt his hip dislocate. Was brought in by EMS and reportedly felt the hip pop back and when he was transferred to the EMS stretcher. He still complaining of some pain in the lower extremity. PCP: Nayely Falcon MD    There are no other complaints, changes, or physical findings at this time.     Current Outpatient Medications   Medication Sig Dispense Refill    atorvastatin (LIPITOR) 80 mg tablet   10    DEXILANT 30 mg capsule   0    HYDROcodone-acetaminophen (NORCO)  mg tablet   0    metoprolol succinate (TOPROL-XL) 50 mg XL tablet   0    pantoprazole (PROTONIX) 40 mg tablet pantoprazole 40 mg tablet,delayed release      onabotulinumtoxinA (BOTOX) 200 unit injection Inject 200 units intramuscularly to the bilateral neck and shoulder muscles every 3 months 1 Vial 3    chlorthalidone (HYGROTEN) 25 mg tablet TAKE 1 TABLET BY MOUTH EVERY MORNING  1    varenicline (CHANTIX STARTING MONTH BOX) 0.5 mg (11)- 1 mg (42) DsPk Chantix Starting Month Box 0.5 mg (11)-1 mg (42) tablets in dose pack       Past History     Past Medical History:  Past Medical History:   Diagnosis Date    Cervical dystonia     Headache     Hypertension      Past Surgical History:  Past Surgical History:   Procedure Laterality Date    HX ORTHOPAEDIC Left     Shoulder     Family History:  Family History Problem Relation Age of Onset    Hypertension Father     Diabetes Father     Gout Father     Alcohol abuse Sister     Hypertension Brother     Gout Brother      Social History:  Social History     Tobacco Use    Smoking status: Current Every Day Smoker     Packs/day: 1.00     Years: 25.00     Pack years: 25.00     Types: Cigarettes    Smokeless tobacco: Never Used   Substance Use Topics    Alcohol use: No     Alcohol/week: 0.0 standard drinks    Drug use: No     Allergies: Allergies   Allergen Reactions    Medrol [Methylprednisolone] Other (comments)     psychosis     Review of Systems   Review of Systems   Constitutional: Negative for chills and fever. HENT: Negative for congestion, rhinorrhea and sore throat. Respiratory: Negative for cough and shortness of breath. Cardiovascular: Negative for chest pain. Gastrointestinal: Negative for abdominal pain, nausea and vomiting. Genitourinary: Negative for dysuria and urgency. Musculoskeletal: Positive for arthralgias (R hip pain). Skin: Negative for rash. Neurological: Negative for dizziness, light-headedness and headaches. All other systems reviewed and are negative. Physical Exam   Physical Exam  Vitals signs and nursing note reviewed. Constitutional:       General: He is not in acute distress. Appearance: He is well-developed. HENT:      Head: Normocephalic and atraumatic. Eyes:      Conjunctiva/sclera: Conjunctivae normal.      Pupils: Pupils are equal, round, and reactive to light. Neck:      Musculoskeletal: Normal range of motion. Cardiovascular:      Rate and Rhythm: Normal rate and regular rhythm. Pulmonary:      Effort: Pulmonary effort is normal. No respiratory distress. Breath sounds: Normal breath sounds. No stridor. Abdominal:      General: There is no distension. Palpations: Abdomen is soft. Tenderness: There is no abdominal tenderness. Musculoskeletal: Normal range of motion. Comments: R hip incision c/d/i, no shortening or foot inversion   Skin:     General: Skin is warm and dry. Neurological:      Mental Status: He is alert and oriented to person, place, and time. Diagnostic Study Results   Labs -   No results found for this or any previous visit (from the past 12 hour(s)). Radiologic Studies -   XR HIP RT W OR WO PELV 2-3 VWS   Final Result   IMPRESSION: Right total hip replacement without acute abnormality. Degenerative   changes left hip. Xr Hip Rt W Or Wo Pelv 2-3 Vws    Result Date: 2/20/2020  IMPRESSION: Right total hip replacement without acute abnormality. Degenerative changes left hip. Medical Decision Making   I am the first provider for this patient. I reviewed the vital signs, available nursing notes, past medical history, past surgical history, family history and social history. Vital Signs-Reviewed the patient's vital signs. Patient Vitals for the past 12 hrs:   Temp Pulse Resp BP SpO2   02/20/20 1616     96 %   02/20/20 1604 98.4 °F (36.9 °C) 96 20 (!) 131/93 99 %       Pulse Oximetry Analysis - 96% on ra      Records Reviewed: Nursing Notes and Old Medical Records    Provider Notes (Medical Decision Making):   Patient presents after a right hip dislocation which appears to spontaneously reduced. Discussed with orthopedics who did see the patient. States that he can weight-bear as tolerated and can follow-up with Dr. Linda Colvin. Patient denies any need for additional pain medications at home. ED Course:   Initial assessment performed. The patients presenting problems have been discussed, and they are in agreement with the care plan formulated and outlined with them. I have encouraged them to ask questions as they arise throughout their visit. Critical Care:  none    Disposition:  Discharge Note:  5:44 PM  The patient has been re-evaluated and is ready for discharge. Reviewed available results with patient.  Counseled patient on diagnosis and care plan. Patient has expressed understanding, and all questions have been answered. Patient agrees with plan and agrees to follow up as recommended, or to return to the ED if their symptoms worsen. Discharge instructions have been provided and explained to the patient, along with reasons to return to the ED. PLAN:  1. Discharge Medication List as of 2/20/2020  5:44 PM        2. Follow-up Information     Follow up With Specialties Details Why Contact Info    Ariana Salamanca MD Family Practice Schedule an appointment as soon as possible for a visit  [address] [facility] [address]  [phone]      [place] [address]  [phone]      [facility] EMERGENCY DEPT Emergency Medicine  As needed, If symptoms worsen [address]  [address] [facility]  [phone]        Return to ED if worse     Diagnosis     Clinical Impression:   1. Right hip pain        This note will not be viewable in [email or web address]. Please note that this dictation was completed with V[employer]ize, the computer voice recognition software. Quite often unanticipated grammatical, syntax, homophones, and other interpretive errors are inadvertently transcribed by the computer software. Please disregard these errors.   Please excuse any errors that have escaped final proofreading

## 2020-02-20 NOTE — DISCHARGE INSTRUCTIONS
Patient Education        Hip Pain: Care Instructions  Your Care Instructions    Hip pain may be caused by many things, including overuse, a fall, or a twisting movement. Another cause of hip pain is arthritis. Your pain may increase when you stand up, walk, or squat. The pain may come and go or may be constant. Home treatment can help relieve hip pain, swelling, and stiffness. If your pain is ongoing, you may need more tests and treatment. Follow-up care is a key part of your treatment and safety. Be sure to make and go to all appointments, and call your doctor if you are having problems. It's also a good idea to know your test results and keep a list of the medicines you take. How can you care for yourself at home? · Take pain medicines exactly as directed. ? If the doctor gave you a prescription medicine for pain, take it as prescribed. ? If you are not taking a prescription pain medicine, ask your doctor if you can take an over-the-counter medicine. · Rest and protect your hip. Take a break from any activity, including standing or walking, that may cause pain. · Put ice or a cold pack against your hip for 10 to 20 minutes at a time. Try to do this every 1 to 2 hours for the next 3 days (when you are awake) or until the swelling goes down. Put a thin cloth between the ice and your skin. · Sleep on your healthy side with a pillow between your knees, or sleep on your back with pillows under your knees. · If there is no swelling, you can put moist heat, a heating pad, or a warm cloth on your hip. Do gentle stretching exercises to help keep your hip flexible. · Learn how to prevent falls. Have your vision and hearing checked regularly. Wear slippers or shoes with a nonskid sole. · Stay at a healthy weight. · Wear comfortable shoes. When should you call for help? Call 911 anytime you think you may need emergency care.  For example, call if:    · You have sudden chest pain and shortness of breath, or you cough up blood.     · You are not able to stand or walk or bear weight.     · Your buttocks, legs, or feet feel numb or tingly.     · Your leg or foot is cool or pale or changes color.     · You have severe pain.    Call your doctor now or seek immediate medical care if:    · You have signs of infection, such as:  ? Increased pain, swelling, warmth, or redness in the hip area. ? Red streaks leading from the hip area. ? Pus draining from the hip area. ? A fever.     · You have signs of a blood clot, such as:  ? Pain in your calf, back of the knee, thigh, or groin. ? Redness and swelling in your leg or groin.     · You are not able to bend, straighten, or move your leg normally.     · You have trouble urinating or having bowel movements.    Watch closely for changes in your health, and be sure to contact your doctor if:    · You do not get better as expected. Where can you learn more? Go to http://chi-jovita.info/. Enter I625 in the search box to learn more about \"Hip Pain: Care Instructions. \"  Current as of: June 26, 2019  Content Version: 12.2  © 4823-7999 [employer]. Care instructions adapted under license by [employer] (which disclaims liability or warranty for this information). If you have questions about a medical condition or this instruction, always ask your healthcare professional. [place] [address] any warranty or liability for your use of this information.

## 2020-02-20 NOTE — CONSULTS
Pt known to DR Liliana Ennis . S/p anterior right total hip. Had pain and sounds like dislocation with spontaneous reduction    Visit Vitals  BP (!) 131/93 (BP 1 Location: Right arm, BP Patient Position: At rest)   Pulse 96   Temp 98.4 °F (36.9 °C)   Resp 20   Ht 6' (1.829 m)   Wt 99.8 kg (220 lb)   SpO2 96%   BMI 29.84 kg/m²     Incision well healed  IR , ER and FF smooth with some discomfort.   Was able to stand and WBAT    Ok to [employer]   wbat  Follow up with DR Liliana Ennis as directed

## 2020-03-04 ENCOUNTER — TELEPHONE (OUTPATIENT)
Dept: NEUROLOGY | Age: 55
End: 2020-03-04

## 2020-03-17 ENCOUNTER — OFFICE VISIT (OUTPATIENT)
Dept: NEUROLOGY | Age: 55
End: 2020-03-17

## 2020-03-17 VITALS
RESPIRATION RATE: 20 BRPM | HEART RATE: 76 BPM | SYSTOLIC BLOOD PRESSURE: 118 MMHG | HEIGHT: 72 IN | DIASTOLIC BLOOD PRESSURE: 84 MMHG | BODY MASS INDEX: 29.84 KG/M2 | OXYGEN SATURATION: 98 %

## 2020-03-17 DIAGNOSIS — G24.3 CERVICAL DYSTONIA: Primary | ICD-10-CM

## 2020-03-17 NOTE — PROCEDURES
Neurology Chemodenervation Note    CC: neck and shoulder pain, headaches    HPI:  Gale Lorenzana is a 48 y.o. RHWM who has a long history of cervical dystonia and receives Botox treatment. Per patient condition started about 14 to 15 years PTC. He was having significant neck and shoulder stiffness and tightness. He would feel his muscles be bumpy. Botox treatment would offered significant benefit 1 week after the injection. Headaches resolve, neck and shoulder tension releases. Muscles smoothen out. Benefit would last for about 2 1/2 months and the headaches would start up followed by the muscle pains. Since his last Botox treatment on 11/29/2019, he reports benefit that started after 1 week. No muscle weakness. Pain and headache free. Benefit waned 1 week PTC with emergence of neck pain and headaches. He is here for another treatment with Botox. Visit Vitals  /84   Pulse 76   Resp 20   Ht 6' (1.829 m)   SpO2 98%   BMI 29.84 kg/m²     TIME OUT performed immediately prior to start of procedure:  Chari Marshall MD, have performed the following reviews on [place] prior to the start of the procedure:      * Patient was identified by name and date of birth   * Agreement on procedure being performed was verified  * Risks and Benefits explained to the patient  * Procedure site verified and marked as necessary  * Patient was positioned for comfort  * Consent was signed and verified      Time: 1500      Date of procedure: 03/17/2020     Procedure performed by: Martin Gupta MD     Provider assisted by: None     Patient assisted by: None     How tolerated by patient: tolerated the procedure well with no complications     Post Procedural Pain Scale: 0/10     Assessment: Cervical dystonia - worsening    PLAN:   Informed consent was obtained. Botulinum toxin type A 200 units were reconstituted in 2 ml of preservative free normal saline to a dose of 100 units per ml.    Aseptic technique was utilized. Area cleansed with alcohol. Needle EMG guidance was used to localized the muscles involved, optimize treatment and prevent complications. The following muscles were treated:  Site       Units  Left levator scapulae    20  Right levator scapulae    20  Left posterior scalene    20  Right posterior scalene    20  Left splenius capitis     10  Right splenius capitis    10  Left SCM      10  Right SCM      10  Right trapezius     10  Left trapezius     10  Right semispinalis capitis    10  Left semispinalis capitis    10  Left longissimus     10  Right longissimus     10  Left splenius cervices    10  Right splenius cervices    10        Total 200        Wastage  0 units      Lot. No.  C3  Exp. Date  08/2022    No immediate complications were encountered. Pressure applied to all sites. Patient tolerated the procedure well and was released to home. The patient was advised of the possibility of some soreness/redness, etc. at the injection sites and to report if he develops any significant problems. If develops problems with swallowing or breathing patient is advised to go to the nearest emergency room.

## 2020-06-16 ENCOUNTER — TELEPHONE (OUTPATIENT)
Dept: NEUROLOGY | Age: 55
End: 2020-06-16

## 2020-06-22 ENCOUNTER — TELEPHONE (OUTPATIENT)
Dept: NEUROLOGY | Age: 55
End: 2020-06-22

## 2020-06-22 NOTE — TELEPHONE ENCOUNTER
----- Message from Jesse Verma sent at 6/22/2020 11:10 AM EDT -----  Regarding: /telephone  General Message/Vendor Calls    Caller's first and last name:      Reason for call: The pt needs to reschedule his 6/19/20 botox appt to another day.     Callback required yes/no and why:yes      Best contact number(s):[phone]

## 2020-06-26 ENCOUNTER — TELEPHONE (OUTPATIENT)
Dept: NEUROLOGY | Age: 55
End: 2020-06-26

## 2020-07-02 ENCOUNTER — OFFICE VISIT (OUTPATIENT)
Dept: NEUROLOGY | Age: 55
End: 2020-07-02

## 2020-07-02 VITALS — TEMPERATURE: 96.4 F

## 2020-07-02 DIAGNOSIS — G24.3 CERVICAL DYSTONIA: Primary | ICD-10-CM

## 2020-07-02 NOTE — PROCEDURES
Neurology Chemodenervation Note    CC: neck and shoulder pain, headaches    HPI:  Prabhu Schaefer is a 48 y.o. RHWM who has a long history of cervical dystonia and receives Botox treatment. Per patient condition started about 14 to 15 years PTC. He was having significant neck and shoulder stiffness and tightness. He would feel his muscles be bumpy. Botox treatment would offered significant benefit 1 week after the injection. Headaches resolve, neck and shoulder tension releases. Muscles smoothen out. Benefit would last for about 2 1/2 months and the headaches would start up followed by the muscle pains. Since his last Botox treatment on 3/17/2020, he reports benefit that started after 1 week. No muscle weakness. Pain and headache free. Benefit waned 2 weeks PTC with emergence of neck pain and headaches. He is here for another treatment with Botox. Visit Vitals  Temp (!) 96.4 °F (35.8 °C) (Temporal)     TIME OUT performed immediately prior to start of procedure:  Gavin Castillo MD, have performed the following reviews on Collins Lazar prior to the start of the procedure:      * Patient was identified by name and date of birth   * Agreement on procedure being performed was verified  * Risks and Benefits explained to the patient  * Procedure site verified and marked as necessary  * Patient was positioned for comfort  * Consent was signed and verified      Time: 6530      Date of procedure: 07/02/2020     Procedure performed by: Zoya Aranda MD     Provider assisted by: None     Patient assisted by: None     How tolerated by patient: tolerated the procedure well with no complications     Post Procedural Pain Scale: 0/10     Assessment: Cervical dystonia - worsening    PLAN:   Informed consent was obtained. Botulinum toxin type A 200 units were reconstituted in 2 ml of preservative free normal saline to a dose of 100 units per ml. Aseptic technique was utilized.   Area cleansed with alcohol. Needle EMG guidance was used to localized the muscles involved, optimize treatment and prevent complications. The following muscles were treated:  Site       Units  Left levator scapulae    20  Right levator scapulae    20  Left posterior scalene    20  Right posterior scalene    20  Left splenius capitis     10  Right splenius capitis    10  Left SCM      10  Right SCM      10  Right trapezius     10  Left trapezius     10  Right semispinalis capitis    10  Left semispinalis capitis    10  Left longissimus     10  Right longissimus     10  Left splenius cervices    10  Right splenius cervices    10        Total 200        Wastage  0 units      Lot. No.  C3  Exp. Date  02/2023    No immediate complications were encountered. Pressure applied to all sites. Patient tolerated the procedure well and was released to home. The patient was advised of the possibility of some soreness/redness, etc. at the injection sites and to report if he develops any significant problems. If develops problems with swallowing or breathing patient is advised to go to the nearest emergency room.

## 2020-07-21 ENCOUNTER — TELEPHONE (OUTPATIENT)
Dept: NEUROLOGY | Age: 55
End: 2020-07-21

## 2020-07-21 DIAGNOSIS — G24.3 CERVICAL DYSTONIA: Primary | ICD-10-CM

## 2020-07-21 RX ORDER — TIZANIDINE 2 MG/1
2 TABLET ORAL
Qty: 90 TAB | Refills: 0 | Status: SHIPPED | OUTPATIENT
Start: 2020-07-21 | End: 2020-08-25

## 2020-07-21 NOTE — TELEPHONE ENCOUNTER
Patient stated he is desperate at this point his botox has not kicked in yet and it doesn't usually take this long. Today was the worst day he went into the store and the pain was so bad he vomitted from it. He is asking if you can send in some tizanidine?   Even his neck muscles feel like they are burning     Please advise

## 2020-07-21 NOTE — TELEPHONE ENCOUNTER
Contacted pt and left detailed VM msg on number provided letting him know of Rx that was sent into his pharmacy.

## 2020-07-27 ENCOUNTER — TELEPHONE (OUTPATIENT)
Dept: NEUROLOGY | Age: 55
End: 2020-07-27

## 2020-08-22 DIAGNOSIS — G24.3 CERVICAL DYSTONIA: ICD-10-CM

## 2020-08-25 RX ORDER — TIZANIDINE 2 MG/1
TABLET ORAL
Qty: 90 TAB | Refills: 0 | Status: SHIPPED | OUTPATIENT
Start: 2020-08-25 | End: 2020-09-23

## 2020-09-23 ENCOUNTER — TELEPHONE (OUTPATIENT)
Dept: NEUROLOGY | Age: 55
End: 2020-09-23

## 2020-09-23 DIAGNOSIS — G24.3 CERVICAL DYSTONIA: ICD-10-CM

## 2020-09-23 RX ORDER — TIZANIDINE 2 MG/1
TABLET ORAL
Qty: 90 TAB | Refills: 2 | Status: SHIPPED | OUTPATIENT
Start: 2020-09-23 | End: 2020-12-23

## 2020-09-24 ENCOUNTER — TELEPHONE (OUTPATIENT)
Dept: NEUROLOGY | Age: 55
End: 2020-09-24

## 2020-09-29 ENCOUNTER — TELEPHONE (OUTPATIENT)
Dept: NEUROLOGY | Age: 55
End: 2020-09-29

## 2020-09-29 DIAGNOSIS — G24.3 CERVICAL DYSTONIA: ICD-10-CM

## 2020-09-29 RX ORDER — ONABOTULINUMTOXINA 200 [USP'U]/1
INJECTION, POWDER, LYOPHILIZED, FOR SOLUTION INTRADERMAL; INTRAMUSCULAR
Qty: 1 VIAL | Refills: 3 | Status: SHIPPED | OUTPATIENT
Start: 2020-09-29 | End: 2022-01-12 | Stop reason: SDUPTHER

## 2020-09-29 NOTE — TELEPHONE ENCOUNTER
Rec'd fax from [facility] re: Botox renewal needed. [facility] for status      and [address]60 approval  Auth# [id number]  10/2/20 - 10/2/21    SPP is [facility]  Ph for \"Botox Team:\" [phone] ext [phone]    Forward to Shahnaz Gillis to set up shipment - Rx has  too. No scheduled appt.

## 2020-10-02 ENCOUNTER — TELEPHONE (OUTPATIENT)
Dept: NEUROLOGY | Age: 55
End: 2020-10-02

## 2020-10-02 NOTE — TELEPHONE ENCOUNTER
Contacted the patient to let him know the botox has been approved and if they pharmacy has not yet reached out then he can call to get it set up for delivery. The office will also have to set up delivery to the office. Once we have that here we can then schedule him for the office. He has verbalized understanding.

## 2020-10-08 ENCOUNTER — TELEPHONE (OUTPATIENT)
Dept: NEUROLOGY | Age: 55
End: 2020-10-08

## 2020-10-08 NOTE — TELEPHONE ENCOUNTER
----- Message from Reji Petty sent at 10/8/2020  8:22 AM EDT -----  Regarding: Dr. Juan Jacobs first and last name: Arnaud Kahn with [address], [place]  Reason for call: Verification of Botox shipment  Callback required yes/no and why: Y  Best contact number(s): [phone], option 2  Details to clarify the request: Arnaud Kahn is requesting a call back for verification of the Botox shipment to the office for this patient.

## 2020-10-13 ENCOUNTER — PATIENT MESSAGE (OUTPATIENT)
Dept: NEUROLOGY | Age: 55
End: 2020-10-13

## 2020-10-19 ENCOUNTER — TELEPHONE (OUTPATIENT)
Dept: NEUROLOGY | Age: 55
End: 2020-10-19

## 2020-10-23 ENCOUNTER — TELEPHONE (OUTPATIENT)
Dept: NEUROLOGY | Age: 55
End: 2020-10-23

## 2020-11-09 ENCOUNTER — OFFICE VISIT (OUTPATIENT)
Dept: NEUROLOGY | Age: 55
End: 2020-11-09
Payer: COMMERCIAL

## 2020-11-09 VITALS — TEMPERATURE: 97.9 F

## 2020-11-09 DIAGNOSIS — G24.3 CERVICAL DYSTONIA: Primary | ICD-10-CM

## 2020-11-09 PROCEDURE — 64616 CHEMODENERV MUSC NECK DYSTON: CPT | Performed by: PSYCHIATRY & NEUROLOGY

## 2020-11-09 PROCEDURE — 95874 GUIDE NERV DESTR NEEDLE EMG: CPT | Performed by: PSYCHIATRY & NEUROLOGY

## 2020-11-09 NOTE — PROCEDURES
Neurology Chemodenervation Note    CC: neck and shoulder pain, headaches    HPI:  Eveleen Aase is a 48 y.o. RHWM who has a long history of cervical dystonia and receives Botox treatment. Per patient condition started about 14 to 15 years PTC. He was having significant neck and shoulder stiffness and tightness. He would feel his muscles be bumpy. Botox treatment would offered significant benefit 1 week after the injection. Headaches resolve, neck and shoulder tension releases. Muscles smoothen out. Benefit would last for about 2 1/2 months and the headaches would start up followed by the muscle pains. Since his last Botox treatment on 7/02/2020, he reports benefit that started after 1 week. No muscle weakness. Pain and headache free. Benefit waned 4 weeks PTC with emergence of neck pain and headaches. He is here for another treatment with Botox. Visit Vitals  Temp 97.9 °F (36.6 °C)       TIME OUT performed immediately prior to start of procedure:  Lissa Jackson MD, have performed the following reviews on Eric Bennett prior to the start of the procedure:      * Patient was identified by name and date of birth   * Agreement on procedure being performed was verified  * Risks and Benefits explained to the patient  * Procedure site verified and marked as necessary  * Patient was positioned for comfort  * Consent was signed and verified      Time: 1500      Date of procedure: 11/09/2020     Procedure performed by: Reza Chance MD     Provider assisted by: None     Patient assisted by: None     How tolerated by patient: tolerated the procedure well with no complications     Post Procedural Pain Scale: 0/10     Assessment: Cervical dystonia - worsening    PLAN:   Informed consent was obtained. Botulinum toxin type A 200 units were reconstituted in 2 ml of preservative free normal saline to a dose of 100 units per ml. Aseptic technique was utilized. Area cleansed with alcohol.   Needle EMG guidance was used to localized the muscles involved, optimize treatment and prevent complications. The following muscles were treated:  Site       Units  Left levator scapulae    20  Right levator scapulae    20  Left posterior scalene    20  Right posterior scalene    20  Left splenius capitis     10  Right splenius capitis    10  Left SCM      10  Right SCM      10  Right trapezius     10  Left trapezius     10  Right semispinalis capitis    10  Left semispinalis capitis    10  Left longissimus     10  Right longissimus     10  Left splenius cervices    10  Right splenius cervices    10        Total 200        Wastage  0 units      Lot. No.  C3  Exp. Date  06/2023    No immediate complications were encountered. Pressure applied to all sites. Patient tolerated the procedure well and was released to home. The patient was advised of the possibility of some soreness/redness, etc. at the injection sites and to report if he develops any significant problems. If develops problems with swallowing or breathing patient is advised to go to the nearest emergency room.

## 2020-11-13 ENCOUNTER — TRANSCRIBE ORDER (OUTPATIENT)
Dept: SCHEDULING | Age: 55
End: 2020-11-13

## 2020-11-29 DIAGNOSIS — M54.16 LUMBAR RADICULOPATHY: Primary | ICD-10-CM

## 2020-12-03 ENCOUNTER — HOSPITAL ENCOUNTER (OUTPATIENT)
Dept: MRI IMAGING | Age: 55
Discharge: HOME | End: 2020-12-03
Attending: PHYSICIAN ASSISTANT
Payer: COMMERCIAL

## 2020-12-03 PROCEDURE — 72148 MRI LUMBAR SPINE W/O DYE: CPT

## 2020-12-06 DIAGNOSIS — M54.16 LUMBAR RADICULOPATHY: ICD-10-CM

## 2020-12-18 DIAGNOSIS — G24.3 CERVICAL DYSTONIA: ICD-10-CM

## 2020-12-23 RX ORDER — TIZANIDINE 2 MG/1
TABLET ORAL
Qty: 270 TAB | Refills: 0 | Status: SHIPPED | OUTPATIENT
Start: 2020-12-23 | End: 2021-05-06

## 2021-02-03 ENCOUNTER — OFFICE VISIT (OUTPATIENT)
Dept: NEUROLOGY | Age: 56
End: 2021-02-03
Payer: COMMERCIAL

## 2021-02-03 DIAGNOSIS — G24.3 CERVICAL DYSTONIA: Primary | ICD-10-CM

## 2021-02-03 PROCEDURE — 95874 GUIDE NERV DESTR NEEDLE EMG: CPT | Performed by: PSYCHIATRY & NEUROLOGY

## 2021-02-03 PROCEDURE — 64616 CHEMODENERV MUSC NECK DYSTON: CPT | Performed by: PSYCHIATRY & NEUROLOGY

## 2021-02-03 NOTE — PROCEDURES
Neurology Chemodenervation Note    CC: neck and shoulder pain, headaches    HPI:  Mague Philip is a 50 y.o. RHWM who has a long history of cervical dystonia and receives Botox treatment. Per patient condition started about 14 to 15 years PTC. He was having significant neck and shoulder stiffness and tightness. He would feel his muscles be bumpy. Botox treatment would offered significant benefit 1 week after the injection. Headaches resolve, neck and shoulder tension releases. Muscles smoothen out. Benefit would last for about 2 1/2 months and the headaches would start up followed by the muscle pains. Since his last Botox treatment on 11/09/2020, he reports benefit that started after 1 week. No muscle weakness. Pain and headache free. Benefit waned 4 weeks PTC with emergence of neck pain and headaches. He is here for another treatment with Botox. TIME OUT performed immediately prior to start of procedure:  Mike Duron MD, have performed the following reviews on Leonard Handy prior to the start of the procedure:      * Patient was identified by name and date of birth   * Agreement on procedure being performed was verified  * Risks and Benefits explained to the patient  * Procedure site verified and marked as necessary  * Patient was positioned for comfort  * Consent was signed and verified      Time: 1500      Date of procedure: 02/03/2021     Procedure performed by: Poli Ledesma MD     Provider assisted by: None     Patient assisted by: None     How tolerated by patient: tolerated the procedure well with no complications     Post Procedural Pain Scale: 0/10     Assessment: Cervical dystonia - worsening    PLAN:   Informed consent was obtained. Botulinum toxin type A 200 units were reconstituted in 2 ml of preservative free normal saline to a dose of 100 units per ml. Aseptic technique was utilized. Area cleansed with alcohol.   Needle EMG guidance was used to localized the muscles involved, optimize treatment and prevent complications. The following muscles were treated:  Site       Units  Left levator scapulae    20  Right levator scapulae    20  Left posterior scalene    20  Right posterior scalene    20  Left splenius capitis     10  Right splenius capitis    10  Left SCM      10  Right SCM      10  Right trapezius     10  Left trapezius     10  Right semispinalis capitis    10  Left semispinalis capitis    10  Left longissimus     10  Right longissimus     10  Left splenius cervices    10  Right splenius cervices    10        Total 200        Wastage  0 units      Lot. No.  C3  Exp. Date  09/2023    No immediate complications were encountered. Pressure applied to all sites. Patient tolerated the procedure well and was released to home. The patient was advised of the possibility of some soreness/redness, etc. at the injection sites and to report if he develops any significant problems. If develops problems with swallowing or breathing patient is advised to go to the nearest emergency room.   RTC in 3 months

## 2021-04-30 ENCOUNTER — OFFICE VISIT (OUTPATIENT)
Dept: NEUROLOGY | Age: 56
End: 2021-04-30
Payer: COMMERCIAL

## 2021-04-30 DIAGNOSIS — G24.3 CERVICAL DYSTONIA: Primary | ICD-10-CM

## 2021-04-30 PROCEDURE — 95874 GUIDE NERV DESTR NEEDLE EMG: CPT | Performed by: PSYCHIATRY & NEUROLOGY

## 2021-04-30 PROCEDURE — 64616 CHEMODENERV MUSC NECK DYSTON: CPT | Performed by: PSYCHIATRY & NEUROLOGY

## 2021-04-30 NOTE — LETTER
4/30/2021 Patient: Taylor Chris YOB: 1965 Date of Visit: 4/30/2021 Kalyani Edwards MD 
[address] [address] Via Fax: [phone] Dear Kalyani Edwards MD, Thank you for referring Mr. Rick Barahona to [facility] for evaluation. My notes for this consultation are attached. If you have questions, please do not hesitate to call me. I look forward to following your patient along with you. Sincerely, Chin Carlson MD

## 2021-04-30 NOTE — PROCEDURES
Neurology Chemodenervation Note    CC: neck and shoulder pain, headaches    HPI:  Nehemiah Barr is a 50 y.o. RHWM who has a long history of cervical dystonia and receives Botox treatment. Per patient condition started about 14 to 15 years PTC. He was having significant neck and shoulder stiffness and tightness. He would feel his muscles be bumpy. Botox treatment would offered significant benefit 1 week after the injection. Headaches resolve, neck and shoulder tension releases. Muscles smoothen out. Benefit would last for about 2 1/2 months and the headaches would start up followed by the muscle pains. Since his last Botox treatment on 2/03/2021, he reports benefit that started after 1 week. No muscle weakness. Pain and headache free. No waning of benefit this time. He is here for another treatment with Botox. TIME OUT performed immediately prior to start of procedure:  Glover Romberg, MD, have performed the following reviews on Desi Lawrence prior to the start of the procedure:      * Patient was identified by name and date of birth   * Agreement on procedure being performed was verified  * Risks and Benefits explained to the patient  * Procedure site verified and marked as necessary  * Patient was positioned for comfort  * Consent was signed and verified      Time: 1110      Date of procedure: 04/30/2021     Procedure performed by: Adonay Box MD     Provider assisted by: None     Patient assisted by: None     How tolerated by patient: tolerated the procedure well with no complications     Post Procedural Pain Scale: 0/10     Assessment: Cervical dystonia - worsening    PLAN:   Informed consent was obtained. Botulinum toxin type A 200 units were reconstituted in 2 ml of preservative free normal saline to a dose of 100 units per ml. Aseptic technique was utilized. Area cleansed with alcohol.   Needle EMG guidance was used to localized the muscles involved, optimize treatment and prevent complications. The following muscles were treated:  Site       Units  Left levator scapulae    20  Right levator scapulae    20  Left posterior scalene    20  Right posterior scalene    20  Left splenius capitis     10  Right splenius capitis    10  Left SCM      10  Right SCM      10  Right trapezius     10  Left trapezius     10  Right semispinalis capitis    10  Left semispinalis capitis    10  Left longissimus     10  Right longissimus     10  Left splenius cervices    10  Right splenius cervices    10        Total 200        Wastage  0 units      Lot. No.  C3  Exp. Date  10/2023    No immediate complications were encountered. Pressure applied to all sites. Patient tolerated the procedure well and was released to home. The patient was advised of the possibility of some soreness/redness, etc. at the injection sites and to report if he develops any significant problems. If develops problems with swallowing or breathing patient is advised to go to the nearest emergency room.   RTC in 3 months

## 2021-07-30 ENCOUNTER — OFFICE VISIT (OUTPATIENT)
Dept: NEUROLOGY | Age: 56
End: 2021-07-30
Payer: COMMERCIAL

## 2021-07-30 VITALS
WEIGHT: 218 LBS | BODY MASS INDEX: 29.53 KG/M2 | DIASTOLIC BLOOD PRESSURE: 92 MMHG | RESPIRATION RATE: 16 BRPM | OXYGEN SATURATION: 98 % | SYSTOLIC BLOOD PRESSURE: 138 MMHG | HEIGHT: 72 IN | HEART RATE: 77 BPM

## 2021-07-30 DIAGNOSIS — G24.3 CERVICAL DYSTONIA: Primary | ICD-10-CM

## 2021-07-30 PROCEDURE — 64616 CHEMODENERV MUSC NECK DYSTON: CPT | Performed by: PSYCHIATRY & NEUROLOGY

## 2021-07-30 PROCEDURE — 95874 GUIDE NERV DESTR NEEDLE EMG: CPT | Performed by: PSYCHIATRY & NEUROLOGY

## 2021-07-30 NOTE — PROCEDURES
Neurology Chemodenervation Note    CC: neck and shoulder pain, headaches    HPI:  Jyoti Dev is a 50 y.o. RHWM who has a long history of cervical dystonia and receives Botox treatment. Per patient condition started about 14 to 15 years PTC. He was having significant neck and shoulder stiffness and tightness. He would feel his muscles be bumpy. Botox treatment would offered significant benefit 1 week after the injection. Headaches resolve, neck and shoulder tension releases. Muscles smoothen out. Benefit would last for about 2 1/2 months and the headaches would start up followed by the muscle pains. Since his last Botox treatment on 4/30/2021, he reports benefit that started after 1 week. No muscle weakness. Pain and headache free. No waning of benefit this time. He is here for another treatment with Botox. TIME OUT performed immediately prior to start of procedure:  Vicky Quintero MD, have performed the following reviews on Real Painter prior to the start of the procedure:      * Patient was identified by name and date of birth   * Agreement on procedure being performed was verified  * Risks and Benefits explained to the patient  * Procedure site verified and marked as necessary  * Patient was positioned for comfort  * Consent was signed and verified      Time: 0940      Date of procedure: 07/30/2021     Procedure performed by: Antolin White MD     Provider assisted by: None     Patient assisted by: None     How tolerated by patient: tolerated the procedure well with no complications     Post Procedural Pain Scale: 0/10     Assessment: Cervical dystonia - worsening    PLAN:   Informed consent was obtained. Botulinum toxin type A 200 units were reconstituted in 2 ml of preservative free normal saline to a dose of 100 units per ml. Aseptic technique was utilized. Area cleansed with alcohol.   Needle EMG guidance was used to localized the muscles involved, optimize treatment and prevent complications. The following muscles were treated:  Site       Units  Left levator scapulae    20  Right levator scapulae    20  Left posterior scalene    20  Right posterior scalene    20  Left splenius capitis     10  Right splenius capitis    10  Left SCM      10  Right SCM      10  Right trapezius     10  Left trapezius     10  Right semispinalis capitis    10  Left semispinalis capitis    10  Left longissimus     10  Right longissimus     10  Left splenius cervices    10  Right splenius cervices    10        Total 200        Wastage  0 units      Lot. No.  C3  Exp. Date  04/2024    No immediate complications were encountered. Pressure applied to all sites. Patient tolerated the procedure well and was released to home. The patient was advised of the possibility of some soreness/redness, etc. at the injection sites and to report if he develops any significant problems. If develops problems with swallowing or breathing patient is advised to go to the nearest emergency room.   RTC in 3 months

## 2021-07-30 NOTE — LETTER
7/30/2021    Patient: Angelia Barth   YOB: 1965   Date of Visit: 7/30/2021     Bautista Cash MD  [address] [place] [address] [address] [id number]  Via Fax: [phone]    Dear Bautista Cash MD,      Thank you for referring Mr. Monika Solano to [facility] for evaluation. My notes for this consultation are attached. If you have questions, please do not hesitate to call me. I look forward to following your patient along with you.       Sincerely,    Enriqueta Hurley MD

## 2021-10-29 ENCOUNTER — OFFICE VISIT (OUTPATIENT)
Dept: NEUROLOGY | Age: 56
End: 2021-10-29
Payer: COMMERCIAL

## 2021-10-29 DIAGNOSIS — G24.3 CERVICAL DYSTONIA: Primary | ICD-10-CM

## 2021-10-29 PROCEDURE — 95874 GUIDE NERV DESTR NEEDLE EMG: CPT | Performed by: PSYCHIATRY & NEUROLOGY

## 2021-10-29 PROCEDURE — 64616 CHEMODENERV MUSC NECK DYSTON: CPT | Performed by: PSYCHIATRY & NEUROLOGY

## 2021-10-29 NOTE — LETTER
10/29/2021    Patient: Tiffany Hughes   YOB: 1965   Date of Visit: 10/29/2021     Peyman Stacy MD  [address] [place] [address] [phone]  [phone]  Via Fax: [phone]    Dear Peyman Stacy MD,      Thank you for referring Mr. Nato Laureano to [facility] for evaluation. My notes for this consultation are attached. If you have questions, please do not hesitate to call me. I look forward to following your patient along with you.       Sincerely,    Floridalma Manzanares MD

## 2021-10-29 NOTE — PROCEDURES
Neurology Chemodenervation Note    CC: neck and shoulder pain, headaches    HPI:  Girish Luna is a 50 y.o. RHWM who has a long history of cervical dystonia and receives Botox treatment. Per patient condition started about 14 to 15 years PTC. He was having significant neck and shoulder stiffness and tightness. He would feel his muscles be bumpy. Botox treatment would offered significant benefit 1 week after the injection. Headaches resolve, neck and shoulder tension releases. Muscles smoothen out. Benefit would last for about 2 1/2 months and the headaches would start up followed by the muscle pains. Since his last Botox treatment on 7/30/2021, he reports benefit that started after 1 week. No muscle weakness. Pain and headache free. No waning of benefit this time. He is here for another treatment with Botox. TIME OUT performed immediately prior to start of procedure:  Oanh Guadalupe MD, have performed the following reviews on Srinath Mchugh prior to the start of the procedure:      * Patient was identified by name and date of birth   * Agreement on procedure being performed was verified  * Risks and Benefits explained to the patient  * Procedure site verified and marked as necessary  * Patient was positioned for comfort  * Consent was signed and verified      Time: 1100      Date of procedure: 10/29/2021     Procedure performed by: Amber Pizano MD     Provider assisted by: None     Patient assisted by: None     How tolerated by patient: tolerated the procedure well with no complications     Post Procedural Pain Scale: 0/10     Assessment: Cervical dystonia - worsening    PLAN:   Informed consent was obtained. Botulinum toxin type A 200 units were reconstituted in 2 ml of preservative free normal saline to a dose of 100 units per ml. Aseptic technique was utilized. Area cleansed with alcohol.   Needle EMG guidance was used to localized the muscles involved, optimize treatment and prevent complications. The following muscles were treated:  Site       Units  Left levator scapulae    20  Right levator scapulae    20  Left posterior scalene    20  Right posterior scalene    20  Left splenius capitis     10  Right splenius capitis    10  Left SCM      10  Right SCM      10  Right trapezius     10  Left trapezius     10  Right semispinalis capitis    10  Left semispinalis capitis    10  Left longissimus     10  Right longissimus     10  Left splenius cervices    10  Right splenius cervices    10        Total 200        Wastage  0 units      Lot. No.  C3  Exp. Date  04/2024    No immediate complications were encountered. Pressure applied to all sites. Patient tolerated the procedure well and was released to home. The patient was advised of the possibility of some soreness/redness, etc. at the injection sites and to report if he develops any significant problems. If develops problems with swallowing or breathing patient is advised to go to the nearest emergency room.   RTC in 3 months

## 2022-01-11 ENCOUNTER — TELEPHONE (OUTPATIENT)
Dept: NEUROLOGY | Age: 57
End: 2022-01-11

## 2022-01-11 NOTE — TELEPHONE ENCOUNTER
Can you help us figure out what specialty pharmacy he will need to use for his botox so we can get it shipped for his injections?

## 2022-01-12 ENCOUNTER — TELEPHONE (OUTPATIENT)
Dept: NEUROLOGY | Age: 57
End: 2022-01-12

## 2022-01-12 DIAGNOSIS — G24.3 CERVICAL DYSTONIA: ICD-10-CM

## 2022-01-12 RX ORDER — ONABOTULINUMTOXINA 200 [USP'U]/1
INJECTION, POWDER, LYOPHILIZED, FOR SOLUTION INTRADERMAL; INTRAMUSCULAR
Qty: 200 UNITS | Refills: 3 | Status: SHIPPED | OUTPATIENT
Start: 2022-01-12

## 2022-01-12 NOTE — TELEPHONE ENCOUNTER
Pt called and explained he got stuck in an accident on the way to appt. He asked if he could still kepp his appt.

## 2022-02-16 ENCOUNTER — TELEPHONE (OUTPATIENT)
Dept: NEUROLOGY | Age: 57
End: 2022-02-16

## 2022-02-17 ENCOUNTER — TELEPHONE (OUTPATIENT)
Dept: NEUROLOGY | Age: 57
End: 2022-02-17

## 2022-02-17 NOTE — TELEPHONE ENCOUNTER
[facility] he had not seen [facility]. Appt scheduled for tomorrow at 12 for botox procedure.

## 2022-02-18 ENCOUNTER — OFFICE VISIT (OUTPATIENT)
Dept: NEUROLOGY | Age: 57
End: 2022-02-18
Payer: COMMERCIAL

## 2022-02-18 DIAGNOSIS — G24.3 CERVICAL DYSTONIA: Primary | ICD-10-CM

## 2022-02-18 PROCEDURE — 64616 CHEMODENERV MUSC NECK DYSTON: CPT | Performed by: PSYCHIATRY & NEUROLOGY

## 2022-02-20 NOTE — PROCEDURES
Neurology Chemodenervation Note    CC: neck and shoulder pain, headaches    HPI:  Chuck Hernandez is a 49 y.o. RHWM who has a long history of cervical dystonia and receives Botox treatment. Per patient condition started about 15 to 16 years PTC. He was having significant neck and shoulder stiffness and tightness. He would feel his muscles be bumpy. Botox treatment would offered significant benefit 1 week after the injection. Headaches resolve, neck and shoulder tension releases. Muscles smoothen out. Benefit would last for about 2 1/2 months and the headaches would start up followed by the muscle pains. Since his last Botox treatment on 10/29/2021, he reports benefit that started after 1 week. No muscle weakness. Pain and headache free. No waning of benefit this time. He is here for another treatment with Botox. TIME OUT performed immediately prior to start of procedure:  Radha Dhillon MD, have performed the following reviews on Idalia Ramirez prior to the start of the procedure:      * Patient was identified by name and date of birth   * Agreement on procedure being performed was verified  * Risks and Benefits explained to the patient  * Procedure site verified and marked as necessary  * Patient was positioned for comfort  * Consent was signed and verified      Time: 1200      Date of procedure: 02/18/2022     Procedure performed by: Rufus Tanner MD     Provider assisted by: None     Patient assisted by: None     How tolerated by patient: tolerated the procedure well with no complications     Post Procedural Pain Scale: 0/10     Assessment: Cervical dystonia - worsening    PLAN:   Informed consent was obtained. Botulinum toxin type A 200 units were reconstituted in 2 ml of preservative free normal saline to a dose of 100 units per ml. Aseptic technique was utilized. Area cleansed with alcohol.   Needle EMG guidance was used to localized the muscles involved, optimize treatment and prevent complications. The following muscles were treated:  Site       Units  Left levator scapulae    20  Right levator scapulae    20  Left posterior scalene    20  Right posterior scalene    20  Left splenius capitis     10  Right splenius capitis    10  Left SCM      10  Right SCM      10  Right trapezius     10  Left trapezius     10  Right semispinalis capitis    10  Left semispinalis capitis    10  Left longissimus     10  Right longissimus     10  Left splenius cervices    10  Right splenius cervices    10        Total 200        Wastage  0 units      Lot. No.  C3  Exp. Date  10/2024    No immediate complications were encountered. Pressure applied to all sites. Patient tolerated the procedure well and was released to home. The patient was advised of the possibility of some soreness/redness, etc. at the injection sites and to report if he develops any significant problems. If develops problems with swallowing or breathing patient is advised to go to the nearest emergency room.   RTC in 3 months

## 2022-03-19 PROBLEM — R06.02 SOB (SHORTNESS OF BREATH): Status: ACTIVE | Noted: 2017-10-12

## 2022-03-30 NOTE — TELEPHONE ENCOUNTER
Re: Botox    Reviewed acct and see Effective 10/11/2021 - 10/11/2022     PA# [facility] [id number]      No pa required for [phone] in network provider     [address] [facility] as of 2022 is now [facility]

## 2022-04-20 ENCOUNTER — TELEPHONE (OUTPATIENT)
Dept: NEUROLOGY | Age: 57
End: 2022-04-20

## 2022-04-22 ENCOUNTER — TELEPHONE (OUTPATIENT)
Dept: NEUROLOGY | Age: 57
End: 2022-04-22

## 2022-05-11 ENCOUNTER — OFFICE VISIT (OUTPATIENT)
Dept: NEUROLOGY | Age: 57
End: 2022-05-11
Payer: COMMERCIAL

## 2022-05-11 DIAGNOSIS — G24.3 CERVICAL DYSTONIA: Primary | ICD-10-CM

## 2022-05-11 PROCEDURE — 95874 GUIDE NERV DESTR NEEDLE EMG: CPT | Performed by: PSYCHIATRY & NEUROLOGY

## 2022-05-11 PROCEDURE — 64616 CHEMODENERV MUSC NECK DYSTON: CPT | Performed by: PSYCHIATRY & NEUROLOGY

## 2022-05-11 NOTE — PROCEDURES
Neurology Chemodenervation Note    CC: neck and shoulder pain, headaches    HPI:  Pedro Smith is a 49 y.o. RHWM who has a long history of cervical dystonia and receives Botox treatment. Per patient condition started about 15 to 16 years PTC. He was having significant neck and shoulder stiffness and tightness. He would feel his muscles be bumpy. Botox treatment would offered significant benefit 1 week after the injection. Headaches resolve, neck and shoulder tension releases. Muscles smoothen out. Benefit would last for about 2 1/2 months and the headaches would start up followed by the muscle pains. Since his last Botox treatment on 2/18/2022, he reports benefit that started after 1 week. No muscle weakness. Pain and headache free. No waning of benefit this time. He is here for another treatment with Botox. Feels benefit is not as good as before    TIME OUT performed immediately prior to start of procedure:  Guanakito Elizabeth MD, have performed the following reviews on Yvette Ocampo prior to the start of the procedure:      * Patient was identified by name and date of birth   * Agreement on procedure being performed was verified  * Risks and Benefits explained to the patient  * Procedure site verified and marked as necessary  * Patient was positioned for comfort  * Consent was signed and verified      Time: 0793      Date of procedure: 05/11/2022     Procedure performed by: Thang Ontiveros MD     Provider assisted by: None     Patient assisted by: None     How tolerated by patient: tolerated the procedure well with no complications     Post Procedural Pain Scale: 0/10     Assessment: Cervical dystonia - worsening    PLAN:   Informed consent was obtained. Botulinum toxin type A 200 units were reconstituted in 4 ml of preservative free normal saline to a dose of 50 units per ml. Aseptic technique was utilized. Area cleansed with alcohol.   Needle EMG guidance was used to localized the muscles involved, optimize treatment and prevent complications. The following muscles were treated:  Site       Units  Left levator scapulae    20  Right levator scapulae    20  Left posterior scalene    20  Right posterior scalene    20  Left splenius capitis     10  Right splenius capitis    10  Left SCM      10  Right SCM      10  Right trapezius     10  Left trapezius     10  Right semispinalis capitis    10  Left semispinalis capitis    10  Left longissimus     10  Right longissimus     10  Left splenius cervices    10  Right splenius cervices    10        Total 200        Wastage  0 units      Lot. No.  AC4  Exp. Date  12/2024    No immediate complications were encountered. Pressure applied to all sites. Patient tolerated the procedure well and was released to home. The patient was advised of the possibility of some soreness/redness, etc. at the injection sites and to report if he develops any significant problems. If develops problems with swallowing or breathing patient is advised to go to the nearest emergency room. Botox 200 units was diluted with 4 cc normal saline. RTC in 3 months    Referred to PT for more aggressive manipulation and dry needling.

## 2022-05-11 NOTE — TELEPHONE ENCOUNTER
----- Message from [facility] sent at 10/19/2020 11:55 AM EDT -----  Regarding: Dr. Gabriela Mejia  Appointment not available    Caller's first and last name and relationship to patient (if not the patient): N/A      Best contact number: [phone]      Preferred date and time: ASAP      Scheduled appointment date and time: N/A      Reason for appointment: Botox injection      Details to clarify the request: Patient is requesting a call back to confirm Botox has been received and schedule an appointment asap for injections. The headaches are getting pretty bad.       [facility] Future Appointments  Date Time Provider Department Center   9/20/2017 8:30 AM Liseth Ritchie MD [facility]   7/11/2018 9:45 AM Ramya Ball MD [facility]        MD notified that patient was feeling a panic attack coming on. Pt clutching chest and alert and oriented, asking for ativan. MD stated she will come to bedside for assessment. Pt refused VS assessment.

## 2022-06-29 ENCOUNTER — TELEPHONE (OUTPATIENT)
Dept: NEUROLOGY | Age: 57
End: 2022-06-29

## 2022-07-07 NOTE — TELEPHONE ENCOUNTER
Returned call to [facility], was on hold for 24 mins to speak to a representative and then call was disconnected.

## 2022-07-11 NOTE — TELEPHONE ENCOUNTER
Spoke with Salina Garrison, from [place], [phone], to schedule delivery of Botox. Botox 200 units x 1 will be delivered to office 7/13/2022 per Salina Garrison.

## 2022-08-03 ENCOUNTER — OFFICE VISIT (OUTPATIENT)
Dept: NEUROLOGY | Age: 57
End: 2022-08-03
Payer: COMMERCIAL

## 2022-08-03 DIAGNOSIS — G24.3 CERVICAL DYSTONIA: Primary | ICD-10-CM

## 2022-08-03 PROCEDURE — 64616 CHEMODENERV MUSC NECK DYSTON: CPT | Performed by: PSYCHIATRY & NEUROLOGY

## 2022-08-03 PROCEDURE — 95874 GUIDE NERV DESTR NEEDLE EMG: CPT | Performed by: PSYCHIATRY & NEUROLOGY

## 2022-08-03 NOTE — PROCEDURES
Neurology Chemodenervation Note    CC: neck and shoulder pain, headaches    HPI:  Yanelis Hameed is a 49 y.o. RHWM who has a long history of cervical dystonia and receives Botox treatment. Per patient condition started about 15 to 16 years PTC. He was having significant neck and shoulder stiffness and tightness. He would feel his muscles be bumpy. Botox treatment would offered significant benefit 1 week after the injection. Headaches resolve, neck and shoulder tension releases. Muscles smoothen out. Benefit would last for about 2 1/2 months and the headaches would start up followed by the muscle pains. Since his last Botox treatment on 5/11/2022, he reports benefit that started after 1 week. No muscle weakness. Pain and headache free. No waning of benefit this time. He is here for another treatment with Botox. Benefit better than before. TIME OUT performed immediately prior to start of procedure:  Jabari Arguello MD, have performed the following reviews on Moore Spells prior to the start of the procedure:      * Patient was identified by name and date of birth   * Agreement on procedure being performed was verified  * Risks and Benefits explained to the patient  * Procedure site verified and marked as necessary  * Patient was positioned for comfort  * Consent was signed and verified      Time: 1430      Date of procedure: 08/03/2022     Procedure performed by: Li Blanco MD     Provider assisted by: None     Patient assisted by: None     How tolerated by patient: tolerated the procedure well with no complications     Post Procedural Pain Scale: 0/10     Assessment: Cervical dystonia - worsening    PLAN:   Informed consent was obtained. Botulinum toxin type A 200 units were reconstituted in 4 ml of preservative free normal saline to a dose of 50 units per ml. Aseptic technique was utilized. Area cleansed with alcohol.   Needle EMG guidance was used to localized the muscles involved, optimize treatment and prevent complications. The following muscles were treated:  Site       Units  Left levator scapulae    20  Right levator scapulae    20  Left posterior scalene    20  Right posterior scalene    20  Left splenius capitis     10  Right splenius capitis    10  Left SCM      10  Right SCM      10  Right trapezius     10  Left trapezius      10  Right semispinalis capitis    10  Left semispinalis capitis    10  Left longissimus     10  Right longissimus     10  Left splenius cervices    10  Right splenius cervices    10        Total 200        Wastage  0 units      Lot. No.  AC4  Exp. Date  01/2025    No immediate complications were encountered. Pressure applied to all sites. Patient tolerated the procedure well and was released to home. The patient was advised of the possibility of some soreness/redness, etc. at the injection sites and to report if he develops any significant problems. If develops problems with swallowing or breathing patient is advised to go to the nearest emergency room. Botox 200 units was diluted with 4 cc normal saline.   RTC in 3 months

## 2022-10-26 ENCOUNTER — OFFICE VISIT (OUTPATIENT)
Dept: NEUROLOGY | Age: 57
End: 2022-10-26
Payer: COMMERCIAL

## 2022-10-26 DIAGNOSIS — G24.3 CERVICAL DYSTONIA: Primary | ICD-10-CM

## 2022-10-26 PROCEDURE — 64616 CHEMODENERV MUSC NECK DYSTON: CPT | Performed by: PSYCHIATRY & NEUROLOGY

## 2022-10-26 PROCEDURE — 95874 GUIDE NERV DESTR NEEDLE EMG: CPT | Performed by: PSYCHIATRY & NEUROLOGY

## 2022-10-26 NOTE — PROCEDURES
Neurology Chemodenervation Note    CC: neck and shoulder pain, headaches    HPI:  Rm Green is a 49 y.o. RHWM who has a long history of cervical dystonia and receives Botox treatment. Per patient condition started about 15 to 16 years PTC. He was having significant neck and shoulder stiffness and tightness. He would feel his muscles be bumpy. Botox treatment would offered significant benefit 1 week after the injection. Headaches resolve, neck and shoulder tension releases. Muscles smoothen out. Benefit would last for about 2 1/2 months and the headaches would start up followed by the muscle pains. Since his last Botox treatment on 8/3/2022, he reports benefit that started after 1 week. No muscle weakness. Pain and headache free. No waning of benefit this time. He is here for another treatment with Botox. Benefit better than before. TIME OUT performed immediately prior to start of procedure:  Shireen Hagan MD, have performed the following reviews on Shaina Cervantes prior to the start of the procedure:      * Patient was identified by name and date of birth   * Agreement on procedure being performed was verified  * Risks and Benefits explained to the patient  * Procedure site verified and marked as necessary  * Patient was positioned for comfort  * Consent was signed and verified      Time: 1140      Date of procedure: 10/26/2022     Procedure performed by: Rob Brown MD     Provider assisted by: None     Patient assisted by: None     How tolerated by patient: tolerated the procedure well with no complications     Post Procedural Pain Scale: 0/10     Assessment: Cervical dystonia - worsening    PLAN:   Informed consent was obtained. Botulinum toxin type A 200 units were reconstituted in 4 ml of preservative free normal saline to a dose of 50 units per ml. Aseptic technique was utilized. Area cleansed with alcohol.   Needle EMG guidance was used to localized the muscles involved, optimize treatment and prevent complications. The following muscles were treated:  Site       Units  Left levator scapulae    20  Right levator scapulae    20  Left posterior scalene    20  Right posterior scalene    20  Left splenius capitis     10  Right splenius capitis    10  Left SCM      10  Right SCM      10  Right trapezius     10  Left trapezius      10  Right semispinalis capitis    10  Left semispinalis capitis    10  Left longissimus     10  Right longissimus     10  Left splenius cervices    10  Right splenius cervices    10        Total 200        Wastage  0 units      Lot. No.  AC4  Exp. Date  02/2025    No immediate complications were encountered. Pressure applied to all sites. Patient tolerated the procedure well and was released to home. The patient was advised of the possibility of some soreness/redness, etc. at the injection sites and to report if he develops any significant problems. If develops problems with swallowing or breathing patient is advised to go to the nearest emergency room. Botox 200 units was diluted with 4 cc normal saline.   RTC in 3 months

## 2022-11-28 DIAGNOSIS — G24.3 CERVICAL DYSTONIA: ICD-10-CM

## 2022-11-28 RX ORDER — ONABOTULINUMTOXINA 200 [USP'U]/1
INJECTION, POWDER, LYOPHILIZED, FOR SOLUTION INTRADERMAL; INTRAMUSCULAR
Qty: 1 EACH | Refills: 3 | Status: SHIPPED | OUTPATIENT
Start: 2022-11-28

## 2022-12-19 ENCOUNTER — TELEPHONE (OUTPATIENT)
Dept: NEUROLOGY | Age: 57
End: 2022-12-19

## 2022-12-19 NOTE — TELEPHONE ENCOUNTER
She's calling stating the patient insurance is out of network. The office needs to call the patient insurance to see where to dispense to.

## 2022-12-29 ENCOUNTER — TELEPHONE (OUTPATIENT)
Dept: NEUROLOGY | Age: 57
End: 2022-12-29

## 2022-12-29 NOTE — TELEPHONE ENCOUNTER
This plan is preferring Xeomin for cervical dystonia. States must try before they will approve Botox. Can you revise Rx to Xeomin, 200 units ()? Can the patient's treatment be switched to a preferred product? I called the medical benefits and the specialty pharmacy has also changed - unable to use [facility] as we did in September. Dr. Joe Pepper - please advise.      Case # [id number] Will fax clinicals to: [phone]

## 2022-12-29 NOTE — TELEPHONE ENCOUNTER
The specialty pharmacy [facility] is no longer preferred on this plan.      Called [address] - for benefits update

## 2023-01-09 ENCOUNTER — TELEPHONE (OUTPATIENT)
Dept: NEUROLOGY | Age: 58
End: 2023-01-09

## 2023-01-09 NOTE — TELEPHONE ENCOUNTER
Botox vs. Xeomin    Rec'd return call from Bryce at [facility]  ph [phone]. She explained that the plan needed further information by Sunday, 1/8 - which of course we were closed and unable to provide that information. I emphasized this was unfair as they were using calendar from 1/5 instead of business days. Giovanna stated this is the plan's policy and she was unable to revise it. At this point, provider can do a peer to peer for Botox by calling [phone]  and ref Case [id number]    Or can order Xeomin that is approved 12/29/22 - 12/29/23 Auth# [id number]. CPT 41029 No P. A. required  Call ref# today [id number]    Per Giovanna, specialty pharmacy - open for any that can bill medical - which [facility] can do. Per message from [facility] is out of his network this year. [address] on [address] - they can also process Xeomin or Botox for this patient - will need the Rx either way. Ramiro to Freddie randolph.

## 2023-01-11 DIAGNOSIS — G24.3 CERVICAL DYSTONIA: Primary | ICD-10-CM

## 2023-01-11 RX ORDER — INCOBOTULINUMTOXINA 200 [USP'U]/1
INJECTION, POWDER, LYOPHILIZED, FOR SOLUTION INTRAMUSCULAR
Qty: 1 EACH | Refills: 3 | Status: SHIPPED | OUTPATIENT
Start: 2023-01-11

## 2023-01-13 ENCOUNTER — TELEPHONE (OUTPATIENT)
Dept: NEUROLOGY | Age: 58
End: 2023-01-13

## 2023-01-13 NOTE — TELEPHONE ENCOUNTER
Spoke with Jane Lorenzana, at [facility], and when they spoke with patient according to patient Xeomin needs to go through [employer] and the patient would not give Ashleye[place] any further insurance.

## 2023-01-17 ENCOUNTER — TELEPHONE (OUTPATIENT)
Dept: NEUROLOGY | Age: 58
End: 2023-01-17

## 2023-01-17 DIAGNOSIS — G24.3 CERVICAL DYSTONIA: ICD-10-CM

## 2023-01-17 RX ORDER — INCOBOTULINUMTOXINA 200 [USP'U]/1
INJECTION, POWDER, LYOPHILIZED, FOR SOLUTION INTRAMUSCULAR
Qty: 1 EACH | Refills: 3 | Status: SHIPPED | OUTPATIENT
Start: 2023-01-17

## 2023-01-17 NOTE — TELEPHONE ENCOUNTER
Xeomin - trying to identify a specialty pharmacy that can fill it when approved under his medical benefit. (Tried [facility] and [facility] - unable to process). Will attempt to escribe or Sheyla, can you call in the Rx to [facility] - as they typically can bill medical and you can also follow up w/ Donte Lnaza - I emailed him and copied you on it. Oskar Reno  Sr.  DC/DE/MD/VA  Physician Engagement  Multiple Sclerosis  Neurology  [facility]3]  Mobile: [phone]  Fax: [phone]  Email: [email or web address]    Oskar,     We need your help ASAP. Patient's appointment was cancelled due to other pharmacies unable to fill. Orion Kelley LPN  will be calling [facility] to call in a Xeomin Rx. To confirm, [facility] can bill medical approval, correct?       Here's the details:     Patient is Simone Ayala   [birth year]      incobotulinumtoxinA (Xeomin) 200 unit solr [[id number]]     Order Details  Dose, Route, Frequency: As Directed   Dispense Quantity: 1 Each Refills: 3          Sig: Inject 200 units intramuscularly to the bilateral neck and shoulder muscles under EMG guidance every 3 months         Start Date: 01/11/23 End Date: --   Written Date: 01/11/23 Expiration Date: --       Diagnosis Association: Cervical dystonia (G24.3)   Providers      Location of delivery:   Authorizing Provider:    MD Dao Hollis [address] [address]   Phone:  [phone]   Fax:  [phone]   MIRELA #:  [id number]   NPI:  [id number]

## 2023-01-19 ENCOUNTER — TELEPHONE (OUTPATIENT)
Dept: NEUROLOGY | Age: 58
End: 2023-01-19

## 2023-01-19 NOTE — TELEPHONE ENCOUNTER
Giovanna with [address] called (ph [phone]) today to say [facility] should be able to fill the Xeomin as [facility] can bill medical (as I understood previously). Dr. Aguiar Mosinee has already escribed it to [facility].       Fyi to Erika Stokes

## 2023-01-30 ENCOUNTER — TELEPHONE (OUTPATIENT)
Dept: NEUROLOGY | Age: 58
End: 2023-01-30

## 2023-01-30 NOTE — TELEPHONE ENCOUNTER
Spoke with Zakia Infante, and Xeomin will be delivered 2/2/2023 at [facility] [address], [address]

## 2023-02-01 ENCOUNTER — TELEPHONE (OUTPATIENT)
Dept: NEUROLOGY | Age: 58
End: 2023-02-01

## 2023-02-01 NOTE — TELEPHONE ENCOUNTER
Sabrina Pereira, from [employer], and Xeomin is scheduled to be delivered 2/2/2023 at [address] [place] [address] [place].

## 2023-02-03 ENCOUNTER — TELEPHONE (OUTPATIENT)
Dept: NEUROLOGY | Age: 58
End: 2023-02-03

## 2023-02-03 NOTE — TELEPHONE ENCOUNTER
On 1/9/23 - my note reflects [facility] can do it but will need the Rx. Email sent to [place] on [address] and c: Ariana Bañuelos today.

## 2023-02-03 NOTE — TELEPHONE ENCOUNTER
Per Sheyla LPN [facility] cannot fill the Xeomin.  ([employer] of [facility] had advised me at the time of the call that [facility] could fill it). Will need to contact the plan again for other options.

## 2023-02-04 ENCOUNTER — TELEPHONE (OUTPATIENT)
Dept: NEUROLOGY | Age: 58
End: 2023-02-04

## 2023-02-04 NOTE — TELEPHONE ENCOUNTER
Xeomin - issue trying to locate a specialty pharmacy who can ship it to our [facility] office. I sent viVood message to patient today explaining difficulty and asked for his assistance to contact his plan.

## 2023-02-07 DIAGNOSIS — G24.3 CERVICAL DYSTONIA: ICD-10-CM

## 2023-02-07 RX ORDER — KETOROLAC TROMETHAMINE 10 MG/1
10 TABLET, FILM COATED ORAL
Qty: 20 TABLET | Refills: 0 | Status: SHIPPED | OUTPATIENT
Start: 2023-02-07

## 2023-02-07 RX ORDER — TIZANIDINE 2 MG/1
2 TABLET ORAL 3 TIMES DAILY
Qty: 90 TABLET | Refills: 2 | Status: SHIPPED | OUTPATIENT
Start: 2023-02-07

## 2023-02-08 ENCOUNTER — TELEPHONE (OUTPATIENT)
Dept: NEUROLOGY | Age: 58
End: 2023-02-08

## 2023-02-08 NOTE — TELEPHONE ENCOUNTER
On 2/3/23: my documentation shows: Auth details for:   Xeomin that is approved 12/29/22 - 12/29/23 Auth# [id number]. If [facility] cannot process it, I was told by [address] on [address] on [employer] - they can also process Xeomin or Botox for this patient - will need the Rx either way.

## 2023-02-17 ENCOUNTER — TELEPHONE (OUTPATIENT)
Dept: NEUROLOGY | Age: 58
End: 2023-02-17

## 2023-02-22 ENCOUNTER — OFFICE VISIT (OUTPATIENT)
Dept: NEUROLOGY | Age: 58
End: 2023-02-22
Payer: COMMERCIAL

## 2023-02-22 DIAGNOSIS — G24.3 CERVICAL DYSTONIA: Primary | ICD-10-CM

## 2023-02-22 PROCEDURE — 64616 CHEMODENERV MUSC NECK DYSTON: CPT | Performed by: PSYCHIATRY & NEUROLOGY

## 2023-02-22 PROCEDURE — 95874 GUIDE NERV DESTR NEEDLE EMG: CPT | Performed by: PSYCHIATRY & NEUROLOGY

## 2023-02-22 NOTE — PROGRESS NOTES
Chief Complaint   Patient presents with    Procedure     Xeomin       Patient states he has had a headache everyday for the 30 days. Consent signed.

## 2023-02-22 NOTE — PROCEDURES
Neurology Chemodenervation Note    TIME OUT performed immediately prior to start of procedure:  Analilia Daniels MD, have performed the following reviews on Ghada Spear prior to the start of the procedure:      * Patient was identified by name and date of birth   * Agreement on procedure being performed was verified  * Risks and Benefits explained to the patient  * Procedure site verified and marked as necessary  * Patient was positioned for comfort  * Consent was signed and verified      Time: 1440      Date of procedure: 02/22/2023     Procedure performed by: Luann Mejia MD     Provider assisted by: None     Patient assisted by: None     How tolerated by patient: tolerated the procedure well with no complications     Post Procedural Pain Scale: 0/10     Assessment: Cervical dystonia - worsening    PLAN:   Informed consent was obtained. Botulinum toxin type A (Xeomin) 200 units were reconstituted in 4 ml of preservative free normal saline to a dose of 50 units per ml. Aseptic technique was utilized. Area cleansed with alcohol. Needle EMG guidance was used to localized the muscles involved, optimize treatment and prevent complications. The following muscles were treated:  Site       Units  Left levator scapulae    20  Right levator scapulae    20  Left posterior scalene    20  Right posterior scalene    20  Left splenius capitis     10  Right splenius capitis    10  Left SCM      10  Right SCM      10  Right trapezius     10  Left trapezius      10  Right semispinalis capitis    10  Left semispinalis capitis    10  Left longissimus     10  Right longissimus     10  Left splenius cervices    10  Right splenius cervices    10        Total 200        Wastage  0 units      Lot. No. [id number]  Exp. Date  01/2025    No immediate complications were encountered. Pressure applied to all sites. Patient tolerated the procedure well and was released to home.  The patient was advised of the possibility of some soreness/redness, etc. at the injection sites and to report if he develops any significant problems. If develops problems with swallowing or breathing patient is advised to go to the nearest emergency room.  RTC in 3 months

## 2023-04-20 DIAGNOSIS — G24.3 CERVICAL DYSTONIA: ICD-10-CM

## 2023-04-20 RX ORDER — KETOROLAC TROMETHAMINE 10 MG/1
10 TABLET, FILM COATED ORAL
Qty: 20 TABLET | Refills: 0 | Status: SHIPPED | OUTPATIENT
Start: 2023-04-20

## 2023-05-12 DIAGNOSIS — G24.3 SPASMODIC TORTICOLLIS: ICD-10-CM

## 2023-05-17 ENCOUNTER — TELEPHONE (OUTPATIENT)
Age: 58
End: 2023-05-17

## 2023-05-17 RX ORDER — TIZANIDINE 2 MG/1
TABLET ORAL
Qty: 270 TABLET | Refills: 1 | Status: SHIPPED | OUTPATIENT
Start: 2023-05-17

## 2023-05-17 NOTE — TELEPHONE ENCOUNTER
Spoke with Quentin Taveras, from [address] [place] [address], to check on delivery of Xeomin that was to be delivered 5/10/2023. Quentin Taveras stated that it was put on hold because it needs a PA. Will give to PA specialist to do PA on Xeomin.

## 2023-05-18 NOTE — TELEPHONE ENCOUNTER
Spoke with patient and states he spoke with his insurance company and informed them that the Xeomin did not help and he was told he could do Botox again but that Dr. Gabriel Hernandez will have to right on RX for the Botox that Xeomin did not work. Informed patient it will also probably need a PA- patient stated he was fine with that.  Patient states he cancelled his appt for 5/22/2023 because it was no point in getting the Xeomin when it didn't help

## 2023-05-23 DIAGNOSIS — G24.3 CERVICAL DYSTONIA: Primary | ICD-10-CM

## 2023-06-21 ENCOUNTER — TELEPHONE (OUTPATIENT)
Age: 58
End: 2023-06-21

## 2023-06-22 ENCOUNTER — TELEPHONE (OUTPATIENT)
Age: 58
End: 2023-06-22

## 2023-06-22 NOTE — TELEPHONE ENCOUNTER
Called patient to inform we have not received any Botox for you so you may need to call [facility] to verify and  Dr. Loida Schultz is out of the office until 7/10/2023 but unable to LM due to voicemail being full.

## 2023-06-23 ENCOUNTER — TELEPHONE (OUTPATIENT)
Age: 58
End: 2023-06-23

## 2023-06-29 DIAGNOSIS — G24.3 SPASMODIC TORTICOLLIS: Primary | ICD-10-CM

## 2023-06-29 RX ORDER — KETOROLAC TROMETHAMINE 10 MG/1
10 TABLET, FILM COATED ORAL EVERY 6 HOURS PRN
Qty: 20 TABLET | Refills: 0 | Status: SHIPPED | OUTPATIENT
Start: 2023-06-29

## 2023-07-12 ENCOUNTER — PROCEDURE VISIT (OUTPATIENT)
Age: 58
End: 2023-07-12
Payer: COMMERCIAL

## 2023-07-12 DIAGNOSIS — G24.3 SPASMODIC TORTICOLLIS: Primary | ICD-10-CM

## 2023-07-12 PROCEDURE — 64616 CHEMODENERV MUSC NECK DYSTON: CPT | Performed by: PSYCHIATRY & NEUROLOGY

## 2023-07-12 PROCEDURE — 95874 GUIDE NERV DESTR NEEDLE EMG: CPT | Performed by: PSYCHIATRY & NEUROLOGY

## 2023-07-12 NOTE — PROGRESS NOTES
Neurology Chemodenervation Note    CC: neck and shoulder pain, headaches    HPI:  Faisal Rosado is a 56 y.o. RHWM who has a long history of cervical dystonia and receives Botox brand treatment. Per patient condition started >17 years PTC. He was having significant neck and shoulder stiffness and tightness. He would feel his muscles be bumpy. Botox treatment would offered significant benefit 1 week after the injection. Headaches resolve, neck and shoulder tension releases. Muscles smoothen out. Benefit would last for about 2 1/2 months and the headaches would start up followed by the muscle pains. Since his last chemodenervation with Xeomin brand 2/22/2023, he reported no benefit at all. Worsening neck pain and headache frequency. TIME OUT performed immediately prior to start of procedure:  Juan David Ochoa MD, have performed the following reviews on [facility] prior to the start of the procedure:      * Patient was identified by name and date of birth   * Agreement on procedure being performed was verified  * Risks and Benefits explained to the patient  * Procedure site verified and marked as necessary  * Patient was positioned for comfort  * Consent was signed and verified      Time: 1100      Date of procedure: 07/12/2023     Procedure performed by: Leslie Mondragon MD     Provider assisted by: None     Patient assisted by: None     How tolerated by patient: tolerated the procedure well with no complications     Post Procedural Pain Scale: 0/10     Assessment: Cervical dystonia - worsening    PLAN:   Informed consent was obtained. Botulinum toxin type A 200 units were reconstituted in 4 ml of preservative free normal saline to a dose of 50 units per ml. Aseptic technique was utilized. Area cleansed with alcohol. Needle EMG guidance was used to localized the muscles involved, optimize treatment and prevent complications.     The following muscles were treated:  Site       Units  Left

## 2023-08-30 ENCOUNTER — TELEPHONE (OUTPATIENT)
Age: 58
End: 2023-08-30

## 2023-08-30 NOTE — TELEPHONE ENCOUNTER
Spoke with Art, at [facility], Botox 200 units will be delivered [facility] [address] [facility]

## 2023-09-07 ENCOUNTER — TELEPHONE (OUTPATIENT)
Age: 58
End: 2023-09-07

## 2023-09-07 NOTE — TELEPHONE ENCOUNTER
Botox 200 units delivered  #[id number]  2 refills  [facility]  [phone]
0 = understands/communicates without difficulty

## 2023-10-04 ENCOUNTER — PROCEDURE VISIT (OUTPATIENT)
Age: 58
End: 2023-10-04
Payer: COMMERCIAL

## 2023-10-04 DIAGNOSIS — G24.3 SPASMODIC TORTICOLLIS: Primary | ICD-10-CM

## 2023-10-04 PROCEDURE — 64616 CHEMODENERV MUSC NECK DYSTON: CPT | Performed by: PSYCHIATRY & NEUROLOGY

## 2023-10-04 PROCEDURE — 95874 GUIDE NERV DESTR NEEDLE EMG: CPT | Performed by: PSYCHIATRY & NEUROLOGY

## 2023-10-04 NOTE — PROGRESS NOTES
Neurology Chemodenervation Note    CC: neck and shoulder pain, headaches    HPI:  Jesusita Watson is a 56 y.o. RHWM who has a long history of cervical dystonia and receives Botox brand treatment. Per patient condition started >17 years PTC. He was having significant neck and shoulder stiffness and tightness. He would feel his muscles be bumpy. Botox treatment would offered significant benefit 1 week after the injection. Headaches resolve, neck and shoulder tension releases. Muscles smoothen out. Benefit would last for about 2 1/2 months and the headaches would start up followed by the muscle pains. Since his last chemodenervation with Botox 7/12/2023, he reports benefit for 2 or more months. Improved neck pain and significantly less headache frequency. TIME OUT performed immediately prior to start of procedure:  Annette Anaya MD, have performed the following reviews on [facility] prior to the start of the procedure:      * Patient was identified by name and date of birth   * Agreement on procedure being performed was verified  * Risks and Benefits explained to the patient  * Procedure site verified and marked as necessary  * Patient was positioned for comfort  * Consent was signed and verified      Time: 4059      Date of procedure: 10/04/2023     Procedure performed by: Danika Weiss MD     Provider assisted by: None     Patient assisted by: None     How tolerated by patient: tolerated the procedure well with no complications     Post Procedural Pain Scale: 0/10     Assessment: Cervical dystonia - worsening    PLAN:   Informed consent was obtained. Botulinum toxin type A 200 units were reconstituted in 4 ml of preservative free normal saline to a dose of 50 units per ml. Aseptic technique was utilized. Area cleansed with alcohol. Needle EMG guidance was used to localized the muscles involved, optimize treatment and prevent complications.     The following muscles were

## 2023-10-25 ENCOUNTER — TELEPHONE (OUTPATIENT)
Age: 58
End: 2023-10-25

## 2023-10-25 NOTE — TELEPHONE ENCOUNTER
Spoke with patient, LUNA verified, and was wondering if Dr. Juaquin Jaimes would put in a referral for physical therapy because he would like to try dry needling and exercises to help with headaches and neck pain because that has seemed to help his wife. Patient states the last Botox treatment did not work as well. Patient would like referral faxed to him at [facility]. [email or web address]. Informed patient will let Dr. Juaquin Jaimes know. Patient verbalized understanding.

## 2023-10-26 DIAGNOSIS — G24.3 SPASMODIC TORTICOLLIS: Primary | ICD-10-CM

## 2023-10-26 NOTE — TELEPHONE ENCOUNTER
Called patient LM have emailed the PT referral to [address]. [email or web address] and received confirmation it went through. If you do not receive it please call back.

## 2023-11-28 ENCOUNTER — TELEPHONE (OUTPATIENT)
Age: 58
End: 2023-11-28

## 2023-12-16 ENCOUNTER — TELEPHONE (OUTPATIENT)
Age: 58
End: 2023-12-16

## 2023-12-16 NOTE — TELEPHONE ENCOUNTER
PSR called and lvm for patient to give us a call back regarding whether or not his insurance information will be different for his upcoming Botox appointment next year.

## 2024-01-03 ENCOUNTER — PROCEDURE VISIT (OUTPATIENT)
Age: 59
End: 2024-01-03
Payer: COMMERCIAL

## 2024-01-03 DIAGNOSIS — G24.3 SPASMODIC TORTICOLLIS: Primary | ICD-10-CM

## 2024-01-03 PROCEDURE — 64616 CHEMODENERV MUSC NECK DYSTON: CPT | Performed by: PSYCHIATRY & NEUROLOGY

## 2024-01-03 PROCEDURE — 95874 GUIDE NERV DESTR NEEDLE EMG: CPT | Performed by: PSYCHIATRY & NEUROLOGY

## 2024-01-03 NOTE — PROGRESS NOTES
Neurology Chemodenervation Note    CC: neck and shoulder pain, headaches    HPI:  García Carter is a 58 y.o. RHWM who has a long history of cervical dystonia and receives Botox brand treatment. Per patient condition started >17 years PTC. He was having significant neck and shoulder stiffness and tightness. He would feel his muscles be bumpy. Botox treatment would offered significant benefit 1 week after the injection. Headaches resolve, neck and shoulder tension releases. Muscles smoothen out. Benefit would last for about 2 1/2 months and the headaches would start up followed by the muscle pains.     Since his last chemodenervation with Botox 10/4/2023, he reports benefit until a week PTC with emergence of neck pain and headache. Significant improvement of his neck pain and significantly less headache frequency.    TIME OUT performed immediately prior to start of procedure:  ILamonte MD, have performed the following reviews on García Carter prior to the start of the procedure:      * Patient was identified by name and date of birth   * Agreement on procedure being performed was verified  * Risks and Benefits explained to the patient  * Procedure site verified and marked as necessary  * Patient was positioned for comfort  * Consent was signed and verified      Time: 1340      Date of procedure: 01/03/2024     Procedure performed by: Lamonte Bai MD     Provider assisted by: None     Patient assisted by: None     How tolerated by patient: tolerated the procedure well with no complications     Post Procedural Pain Scale: 0/10     Assessment: Cervical dystonia - worsening    PLAN:   Informed consent was obtained.  Botulinum toxin type A 200 units were reconstituted in 4 ml of preservative free normal saline to a dose of 50 units per ml.   Aseptic technique was utilized.  Area cleansed with alcohol.  Needle EMG guidance was used to localized the muscles involved, optimize treatment and prevent

## 2024-02-21 ENCOUNTER — TELEPHONE (OUTPATIENT)
Age: 59
End: 2024-02-21

## 2024-03-27 ENCOUNTER — PROCEDURE VISIT (OUTPATIENT)
Age: 59
End: 2024-03-27

## 2024-03-27 DIAGNOSIS — G24.3 SPASMODIC TORTICOLLIS: Primary | ICD-10-CM

## 2024-03-27 NOTE — PROGRESS NOTES
Neurology Chemodenervation Note    CC: neck and shoulder pain, headaches    HPI:  García Carter is a 58 y.o. RHWM who has a long history of cervical dystonia and receives Botox brand treatment. Per patient condition started >17 years PTC. He was having significant neck and shoulder stiffness and tightness. He would feel his muscles be bumpy. Botox treatment would offered significant benefit 1 week after the injection. Headaches resolve, neck and shoulder tension releases. Muscles smoothen out. Benefit would last for about 2 1/2 months and the headaches would start up followed by the muscle pains.     Since his last chemodenervation with Botox 1/3/2024, he reports benefit until a week PTC with emergence of neck pain and headache. Significant improvement of his neck pain and significantly less headache frequency.    TIME OUT performed immediately prior to start of procedure:  ILamonte MD, have performed the following reviews on García Carter prior to the start of the procedure:      * Patient was identified by name and date of birth   * Agreement on procedure being performed was verified  * Risks and Benefits explained to the patient  * Procedure site verified and marked as necessary  * Patient was positioned for comfort  * Consent was signed and verified      Time: 1340      Date of procedure: 03/27/2024     Procedure performed by: Lamonte Bai MD     Provider assisted by: None     Patient assisted by: None     How tolerated by patient: tolerated the procedure well with no complications     Post Procedural Pain Scale: 0/10     Assessment: Cervical dystonia - worsening    PLAN:   Informed consent was obtained.  Botulinum toxin type A 200 units were reconstituted in 4 ml of preservative free normal saline to a dose of 50 units per ml.   Aseptic technique was utilized.  Area cleansed with alcohol.  Needle EMG guidance was used to localized the muscles involved, optimize treatment and prevent

## 2024-06-04 DIAGNOSIS — G24.3 SPASMODIC TORTICOLLIS: ICD-10-CM

## 2024-06-04 RX ORDER — TIZANIDINE 2 MG/1
2 TABLET ORAL 3 TIMES DAILY
Qty: 270 TABLET | Refills: 1 | Status: SHIPPED | OUTPATIENT
Start: 2024-06-04

## 2024-06-19 ENCOUNTER — PROCEDURE VISIT (OUTPATIENT)
Age: 59
End: 2024-06-19
Payer: COMMERCIAL

## 2024-06-19 DIAGNOSIS — G24.3 SPASMODIC TORTICOLLIS: Primary | ICD-10-CM

## 2024-06-19 PROCEDURE — 64616 CHEMODENERV MUSC NECK DYSTON: CPT | Performed by: PSYCHIATRY & NEUROLOGY

## 2024-06-19 PROCEDURE — 95874 GUIDE NERV DESTR NEEDLE EMG: CPT | Performed by: PSYCHIATRY & NEUROLOGY

## 2024-06-19 NOTE — PROGRESS NOTES
Neurology Chemodenervation Note    CC: neck and shoulder pain, headaches    HPI:  García Caretr is a 58 y.o. RHWM who has a long history of cervical dystonia and receives Botox brand treatment. Per patient condition started >17 years PTC. He was having significant neck and shoulder stiffness and tightness. He would feel his muscles be bumpy. Botox treatment would offered significant benefit 1 week after the injection. Headaches resolve, neck and shoulder tension releases. Muscles smoothen out. Benefit would last for about 2 1/2 months and the headaches would start up followed by the muscle pains.     Since his last chemodenervation with Botox 3/27/2024, he reports benefit until 2 weeks PTC with emergence of neck pain and headache. Significant improvement of his neck pain and significantly less headache frequency.    TIME OUT performed immediately prior to start of procedure:  ILamonte MD, have performed the following reviews on García Carter prior to the start of the procedure:      * Patient was identified by name and date of birth   * Agreement on procedure being performed was verified  * Risks and Benefits explained to the patient  * Procedure site verified and marked as necessary  * Patient was positioned for comfort  * Consent was signed and verified      Time: 1310      Date of procedure: 06/19/2024     Procedure performed by: Lamonte Bai MD     Provider assisted by: None     Patient assisted by: None     How tolerated by patient: tolerated the procedure well with no complications     Post Procedural Pain Scale: 0/10     Assessment: Cervical dystonia - worsening    PLAN:   Informed consent was obtained.  Botulinum toxin type A 200 units were reconstituted in 4 ml of preservative free normal saline to a dose of 50 units per ml.   Aseptic technique was utilized.  Area cleansed with alcohol.  Needle EMG guidance was used to localized the muscles involved, optimize treatment and

## 2024-12-02 NOTE — TELEPHONE ENCOUNTER
Returned their call and have scheduled delivery for his botox for this Friday, April 22nd 2022. Address verified. Admission

## 2025-01-15 DIAGNOSIS — G24.3 SPASMODIC TORTICOLLIS: ICD-10-CM

## 2025-01-15 RX ORDER — TIZANIDINE 2 MG/1
2 TABLET ORAL 3 TIMES DAILY
Qty: 270 TABLET | Refills: 0 | Status: SHIPPED | OUTPATIENT
Start: 2025-01-15

## 2025-01-16 ENCOUNTER — TELEPHONE (OUTPATIENT)
Age: 60
End: 2025-01-16

## 2025-01-16 NOTE — TELEPHONE ENCOUNTER
Spoke with patient, LUNA verified. Patient states he has insurance now and would like to start back on Botox. Informed patient will need an office visit because will need documentation for the PA specialist because a PA will need to be done. Offered patient appt 2025 at 8:20am at the [place] location and arrive 15 minutes prior to appt. Patient accepted appt and verbalized understanding.

## 2025-02-14 ENCOUNTER — TELEPHONE (OUTPATIENT)
Age: 60
End: 2025-02-14

## 2025-02-14 ENCOUNTER — OFFICE VISIT (OUTPATIENT)
Age: 60
End: 2025-02-14
Payer: COMMERCIAL

## 2025-02-14 VITALS
DIASTOLIC BLOOD PRESSURE: 80 MMHG | HEIGHT: 72 IN | RESPIRATION RATE: 16 BRPM | BODY MASS INDEX: 30.48 KG/M2 | HEART RATE: 69 BPM | TEMPERATURE: 96.9 F | SYSTOLIC BLOOD PRESSURE: 132 MMHG | WEIGHT: 225 LBS | OXYGEN SATURATION: 96 %

## 2025-02-14 DIAGNOSIS — G44.86 CERVICOGENIC HEADACHE: ICD-10-CM

## 2025-02-14 DIAGNOSIS — G24.3 CERVICAL DYSTONIA: Primary | ICD-10-CM

## 2025-02-14 PROCEDURE — 99214 OFFICE O/P EST MOD 30 MIN: CPT | Performed by: PSYCHIATRY & NEUROLOGY

## 2025-02-14 ASSESSMENT — PATIENT HEALTH QUESTIONNAIRE - PHQ9
SUM OF ALL RESPONSES TO PHQ QUESTIONS 1-9: 0
SUM OF ALL RESPONSES TO PHQ QUESTIONS 1-9: 0
2. FEELING DOWN, DEPRESSED OR HOPELESS: NOT AT ALL
SUM OF ALL RESPONSES TO PHQ QUESTIONS 1-9: 0
SUM OF ALL RESPONSES TO PHQ9 QUESTIONS 1 & 2: 0
SUM OF ALL RESPONSES TO PHQ QUESTIONS 1-9: 0
1. LITTLE INTEREST OR PLEASURE IN DOING THINGS: NOT AT ALL

## 2025-02-14 NOTE — TELEPHONE ENCOUNTER
Chief Complaint   Patient presents with    Prior Authorization     Botox PA needed- patient restarting- patient now has insurance.

## 2025-02-14 NOTE — PROGRESS NOTES
NEUROLOGY CLINIC NOTE    Patient ID:  García Carter  [id number]  59 y.o.  1965    Date of Visit:  February 14, 2025    Referring Physician: No ref. provider found     Reason for Visit: neck pain and headache    Chief Complaint   Patient presents with    Neck Pain     Follow up- patient  reports has burning sensation and tightness in neck.  Patient states he has had about 20 headaches in the last 30 days but not as intense.  Patient would like to restart Botox       History of Present Illness:     Patient Active Problem List    Diagnosis Date Noted    SOB (shortness of breath) 10/12/2017     Past Medical History:   Diagnosis Date    Cervical dystonia     Headache     Hypertension       Past Surgical History:   Procedure Laterality Date    ORTHOPEDIC SURGERY Left     Shoulder    OTHER SURGICAL HISTORY  02/2020    right hip replacement       Current Outpatient Medications on File Prior to Visit   Medication Sig Dispense Refill    tiZANidine (ZANAFLEX) 2 MG tablet TAKE 1 TABLET BY MOUTH THREE TIMES A  tablet 0    atorvastatin (LIPITOR) 80 MG tablet ceived the following from [facility]: Outside name: atorvastatin (LIPITOR) 80 mg tablet      chlorthalidone (HYGROTON) 25 MG tablet TAKE 1 TABLET BY MOUTH EVERY MORNING      metoprolol succinate (TOPROL XL) 50 MG extended release tablet ceived the following from [facility]: Outside name: metoprolol succinate (TOPROL-XL) 50 mg XL tablet      Onabotulinumtoxin A (BOTOX) 200 units injection INJECT 200 UNITS  INTRAMUSCULARLY TO THE  BILATERAL NECK AND SHOULDER MUSCLES EVERY 3 MONTHS (no benefit with Xeomin) 1 each 3     No current facility-administered medications on file prior to visit.       Allergies   Allergen Reactions    Methylprednisolone Other (See Comments)     psychosis      Social History     Tobacco Use    Smoking status: Former     Current packs/day: 0.00     Average packs/day: 1 pack/day for 35.3 years (35.3 ttl

## 2025-03-26 ENCOUNTER — PROCEDURE VISIT (OUTPATIENT)
Age: 60
End: 2025-03-26
Payer: COMMERCIAL

## 2025-03-26 DIAGNOSIS — G24.3 CERVICAL DYSTONIA: Primary | ICD-10-CM

## 2025-03-26 PROCEDURE — 95874 GUIDE NERV DESTR NEEDLE EMG: CPT | Performed by: PSYCHIATRY & NEUROLOGY

## 2025-03-26 PROCEDURE — 64616 CHEMODENERV MUSC NECK DYSTON: CPT | Performed by: PSYCHIATRY & NEUROLOGY

## 2025-03-26 NOTE — PROGRESS NOTES
Neurology Chemodenervation Note    CC: neck and shoulder pain, headaches    HPI:  García Carter is a 59 y.o. RHWM who has a long history of cervical dystonia and receives Botox brand treatment. Per patient condition started >18 years PTC. He was having significant neck and shoulder stiffness and tightness. He would feel his muscles be bumpy. Botox treatment would offered significant benefit 1 week after the injection. Headaches resolve, neck and shoulder tension releases. Muscles smoothen out. Benefit would last for about 2 1/2 months and the headaches would start up followed by the muscle pains.     Since his last chemodenervation with Botox 6/19/2024, he reports benefit with the treatment. Unfortunately due to lack of insurance and new authorization, treatment has been delayed until today.     TIME OUT performed immediately prior to start of procedure:  ILamonte MD, have performed the following reviews on García Carter prior to the start of the procedure:      * Patient was identified by name and date of birth   * Agreement on procedure being performed was verified  * Risks and Benefits explained to the patient  * Procedure site verified and marked as necessary  * Patient was positioned for comfort  * Consent was signed and verified      Time: 0800      Date of procedure: 03/26/2025     Procedure performed by: Lamonte Bai MD     Provider assisted by: None     Patient assisted by: None     How tolerated by patient: tolerated the procedure well with no complications     Post Procedural Pain Scale: 0/10     Assessment: Cervical dystonia - worsening    PLAN:   Informed consent was obtained.  Botulinum toxin type A 200 units were reconstituted in 4 ml of preservative free normal saline to a dose of 50 units per ml.   Aseptic technique was utilized.  Area cleansed with alcohol.  Needle EMG guidance was used to localized the muscles involved, optimize treatment and prevent

## 2025-04-07 DIAGNOSIS — G24.3 SPASMODIC TORTICOLLIS: ICD-10-CM

## 2025-04-07 RX ORDER — TIZANIDINE 2 MG/1
2 TABLET ORAL 3 TIMES DAILY
Qty: 270 TABLET | Refills: 0 | Status: SHIPPED | OUTPATIENT
Start: 2025-04-07

## 2025-06-18 ENCOUNTER — PROCEDURE VISIT (OUTPATIENT)
Age: 60
End: 2025-06-18
Payer: COMMERCIAL

## 2025-06-18 DIAGNOSIS — G24.3 SPASMODIC TORTICOLLIS: Primary | ICD-10-CM

## 2025-06-18 PROCEDURE — 64616 CHEMODENERV MUSC NECK DYSTON: CPT | Performed by: PSYCHIATRY & NEUROLOGY

## 2025-06-18 PROCEDURE — 95874 GUIDE NERV DESTR NEEDLE EMG: CPT | Performed by: PSYCHIATRY & NEUROLOGY

## 2025-06-18 NOTE — PROGRESS NOTES
Neurology Chemodenervation Note    CC: neck and shoulder pain, headaches    HPI:  García Carter is a 59 y.o. RHWM who has a long history of cervical dystonia and receives Botox brand treatment. Per patient condition started >18 years PTC. He was having significant neck and shoulder stiffness and tightness. He would feel his muscles be bumpy. Botox treatment would offered significant benefit 1 week after the injection. Headaches resolve, neck and shoulder tension releases. Muscles smoothen out. Benefit would last for about 2 1/2 months and the headaches would start up followed by the muscle pains.     TIME OUT performed immediately prior to start of procedure:  ILamonte MD, have performed the following reviews on García Carter prior to the start of the procedure:      * Patient was identified by name and date of birth   * Agreement on procedure being performed was verified  * Risks and Benefits explained to the patient  * Procedure site verified and marked as necessary  * Patient was positioned for comfort  * Consent was signed and verified      Time: 1010      Date of procedure: 06/18/2025     Procedure performed by: Lamonte Bai MD     Provider assisted by: None     Patient assisted by: None     How tolerated by patient: tolerated the procedure well with no complications     Post Procedural Pain Scale: 0/10     Assessment: Cervical dystonia - worsening    PLAN:   Informed consent was obtained.  Botulinum toxin type A 200 units were reconstituted in 4 ml of preservative free normal saline to a dose of 50 units per ml.   Aseptic technique was utilized.  Area cleansed with alcohol.  Needle EMG guidance was used to localized the muscles involved, optimize treatment and prevent complications.    The following muscles were treated:  Site       Units  Left levator scapulae    20  Right levator scapulae    20  Left posterior scalene    20  Right posterior scalene    20  Left splenius